# Patient Record
Sex: MALE | Race: WHITE | Employment: OTHER | RURAL
[De-identification: names, ages, dates, MRNs, and addresses within clinical notes are randomized per-mention and may not be internally consistent; named-entity substitution may affect disease eponyms.]

---

## 2021-03-01 PROBLEM — M17.11 PRIMARY OSTEOARTHRITIS OF RIGHT KNEE: Status: ACTIVE | Noted: 2021-03-01

## 2021-03-02 PROBLEM — M17.11 PRIMARY OSTEOARTHRITIS OF RIGHT KNEE: Status: RESOLVED | Noted: 2021-03-01 | Resolved: 2021-03-02

## 2021-03-02 PROBLEM — M17.11 PRIMARY OSTEOARTHRITIS OF RIGHT KNEE: Status: ACTIVE | Noted: 2021-03-02

## 2021-03-03 PROBLEM — M17.11 PRIMARY OSTEOARTHRITIS OF RIGHT KNEE: Status: RESOLVED | Noted: 2021-03-02 | Resolved: 2021-03-03

## 2021-09-24 ENCOUNTER — HOSPITAL ENCOUNTER (OUTPATIENT)
Dept: GENERAL RADIOLOGY | Age: 66
Discharge: HOME | End: 2021-09-24
Payer: MEDICARE

## 2021-09-24 ENCOUNTER — TRANSCRIBE ORDER (OUTPATIENT)
Dept: REGISTRATION | Age: 66
End: 2021-09-24

## 2021-09-24 DIAGNOSIS — M25.512 LEFT SHOULDER PAIN: ICD-10-CM

## 2021-09-24 DIAGNOSIS — M25.511 RIGHT SHOULDER PAIN: ICD-10-CM

## 2021-09-24 DIAGNOSIS — M25.511 RIGHT SHOULDER PAIN: Primary | ICD-10-CM

## 2021-09-24 PROCEDURE — 73030 X-RAY EXAM OF SHOULDER: CPT

## 2022-02-03 ENCOUNTER — TRANSCRIBE ORDER (OUTPATIENT)
Dept: SCHEDULING | Age: 67
End: 2022-02-03

## 2022-02-03 DIAGNOSIS — M19.011 LOCALIZED OSTEOARTHROSIS OF RIGHT SHOULDER REGION: Primary | ICD-10-CM

## 2022-02-04 ENCOUNTER — TRANSCRIBE ORDER (OUTPATIENT)
Dept: SCHEDULING | Age: 67
End: 2022-02-04

## 2022-02-04 DIAGNOSIS — M19.011 OSTEOARTHRITIS OF RIGHT SHOULDER REGION: Primary | ICD-10-CM

## 2022-02-22 ENCOUNTER — HOSPITAL ENCOUNTER (OUTPATIENT)
Dept: CT IMAGING | Age: 67
Discharge: HOME | End: 2022-02-22
Attending: ORTHOPAEDIC SURGERY
Payer: MEDICARE

## 2022-02-22 DIAGNOSIS — M19.011 OSTEOARTHRITIS OF RIGHT SHOULDER REGION: ICD-10-CM

## 2022-02-22 PROCEDURE — 73200 CT UPPER EXTREMITY W/O DYE: CPT

## 2022-03-15 ENCOUNTER — HOSPITAL ENCOUNTER (OUTPATIENT)
Dept: PREADMISSION TESTING | Age: 67
Discharge: HOME | End: 2022-03-15
Attending: ORTHOPAEDIC SURGERY
Payer: MEDICARE

## 2022-03-15 VITALS
HEIGHT: 71 IN | DIASTOLIC BLOOD PRESSURE: 81 MMHG | BODY MASS INDEX: 32.72 KG/M2 | WEIGHT: 233.69 LBS | TEMPERATURE: 98 F | HEART RATE: 80 BPM | RESPIRATION RATE: 20 BRPM | OXYGEN SATURATION: 99 % | SYSTOLIC BLOOD PRESSURE: 143 MMHG

## 2022-03-15 LAB
ABO + RH BLD: NORMAL
ALBUMIN SERPL-MCNC: 4.3 G/DL (ref 3.5–5)
ALBUMIN/GLOB SERPL: 1 {RATIO} (ref 1.1–2.2)
ALP SERPL-CCNC: 71 U/L (ref 45–117)
ALT SERPL-CCNC: 66 U/L (ref 12–78)
ANION GAP SERPL CALC-SCNC: 6 MMOL/L (ref 5–15)
APPEARANCE UR: CLEAR
AST SERPL-CCNC: 31 U/L (ref 15–37)
BACTERIA URNS QL MICRO: NEGATIVE /HPF
BILIRUB SERPL-MCNC: 0.6 MG/DL (ref 0.2–1)
BILIRUB UR QL: NEGATIVE
BLOOD GROUP ANTIBODIES SERPL: NORMAL
BUN SERPL-MCNC: 13 MG/DL (ref 6–20)
BUN/CREAT SERPL: 14 (ref 12–20)
CALCIUM SERPL-MCNC: 9.8 MG/DL (ref 8.5–10.1)
CHLORIDE SERPL-SCNC: 103 MMOL/L (ref 97–108)
CO2 SERPL-SCNC: 25 MMOL/L (ref 21–32)
COLOR UR: NORMAL
CREAT SERPL-MCNC: 0.92 MG/DL (ref 0.7–1.3)
EPITH CASTS URNS QL MICRO: NORMAL /LPF
ERYTHROCYTE [DISTWIDTH] IN BLOOD BY AUTOMATED COUNT: 14 % (ref 11.5–14.5)
GLOBULIN SER CALC-MCNC: 4.3 G/DL (ref 2–4)
GLUCOSE SERPL-MCNC: 88 MG/DL (ref 65–100)
GLUCOSE UR STRIP.AUTO-MCNC: NEGATIVE MG/DL
HCT VFR BLD AUTO: 49.5 % (ref 36.6–50.3)
HGB BLD-MCNC: 16.6 G/DL (ref 12.1–17)
HGB UR QL STRIP: NEGATIVE
HYALINE CASTS URNS QL MICRO: NORMAL /LPF (ref 0–5)
INR PPP: 1.1 (ref 0.9–1.1)
KETONES UR QL STRIP.AUTO: NEGATIVE MG/DL
LEUKOCYTE ESTERASE UR QL STRIP.AUTO: NEGATIVE
MCH RBC QN AUTO: 29.3 PG (ref 26–34)
MCHC RBC AUTO-ENTMCNC: 33.5 G/DL (ref 30–36.5)
MCV RBC AUTO: 87.3 FL (ref 80–99)
NITRITE UR QL STRIP.AUTO: NEGATIVE
NRBC # BLD: 0 K/UL (ref 0–0.01)
NRBC BLD-RTO: 0 PER 100 WBC
PH UR STRIP: 7 [PH] (ref 5–8)
PLATELET # BLD AUTO: 253 K/UL (ref 150–400)
PMV BLD AUTO: 9.3 FL (ref 8.9–12.9)
POTASSIUM SERPL-SCNC: 3.8 MMOL/L (ref 3.5–5.1)
PROT SERPL-MCNC: 8.6 G/DL (ref 6.4–8.2)
PROT UR STRIP-MCNC: NEGATIVE MG/DL
PROTHROMBIN TIME: 11.3 SEC (ref 9–11.1)
RBC # BLD AUTO: 5.67 M/UL (ref 4.1–5.7)
RBC #/AREA URNS HPF: NORMAL /HPF (ref 0–5)
SODIUM SERPL-SCNC: 134 MMOL/L (ref 136–145)
SP GR UR REFRACTOMETRY: 1.01 (ref 1–1.03)
SPECIMEN EXP DATE BLD: NORMAL
UA: UC IF INDICATED,UAUC: NORMAL
UROBILINOGEN UR QL STRIP.AUTO: 0.2 EU/DL (ref 0.2–1)
WBC # BLD AUTO: 7.3 K/UL (ref 4.1–11.1)
WBC URNS QL MICRO: NORMAL /HPF (ref 0–4)

## 2022-03-15 PROCEDURE — 36415 COLL VENOUS BLD VENIPUNCTURE: CPT

## 2022-03-15 PROCEDURE — 82985 ASSAY OF GLYCATED PROTEIN: CPT

## 2022-03-15 PROCEDURE — 85610 PROTHROMBIN TIME: CPT

## 2022-03-15 PROCEDURE — 86900 BLOOD TYPING SEROLOGIC ABO: CPT

## 2022-03-15 PROCEDURE — 80053 COMPREHEN METABOLIC PANEL: CPT

## 2022-03-15 PROCEDURE — 93005 ELECTROCARDIOGRAM TRACING: CPT

## 2022-03-15 PROCEDURE — 97535 SELF CARE MNGMENT TRAINING: CPT | Performed by: OCCUPATIONAL THERAPIST

## 2022-03-15 PROCEDURE — 81001 URINALYSIS AUTO W/SCOPE: CPT

## 2022-03-15 PROCEDURE — 85027 COMPLETE CBC AUTOMATED: CPT

## 2022-03-15 PROCEDURE — 97165 OT EVAL LOW COMPLEX 30 MIN: CPT | Performed by: OCCUPATIONAL THERAPIST

## 2022-03-15 RX ORDER — CHOLECALCIFEROL (VITAMIN D3) 125 MCG
CAPSULE ORAL AS NEEDED
COMMUNITY
End: 2022-03-25

## 2022-03-15 NOTE — PROGRESS NOTES
[facility]  Occupational Therapy Pre-surgery evaluation  [address]  [place], [address] [facility]    OCCUPATIONAL THERAPY PRE TOTAL SHOULDER SURGERY EVALUATION  Patient:Tre Caicedo (77 y.o. 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 Pt is independent at baseline and anticipate pt will do well post surgery. Discussed anticipated disposition to home with possible discharge within a 1 - 2 day time frame post-surgery. Patient and  in agreement. Anticipate patient will not need acute PT and OT orders based on possible  deficits post surgery. Pt is currently independent in adls and IADLs and verbalizes understanding of all education provided this date. Pt reports that he has been practicing using One handed techniques for all adls. He reports familiarity with recovering from joint replacement surgery in his R knee and voices good progress in therapy at that time. GOALS: (Goals have been discussed and agreed upon with patient.)  DISCHARGE GOALS: Time Frame: 1 DAY  1. Patient will demonstrate and/or verbalize full understanding of instructions related compensatory UB ADL techniques, sling management, shoulder/UE positioning for pain control, post operative shoulder exercises and functional mobility in order to minimize functional deficits in preparation for their upcoming surgery.  This will be achieved by using education, demonstration and through the use of an informational handout including a home exercise program.  REHABILITATION POTENTIAL FOR STATED GOALS: Excellent     RECOMMENDATIONS AND PLANNED INTERVENTIONS: (Benefits and precautions of occupational therapy have been discussed with the patient.)    TREATMENT PLAN EFFECTIVE DATES: 3/15/2022 to 3/15/2022        SUBJECTIVE:   Patient stated I've been practicing getting up without pushing.     OBJECTIVE DATA SUMMARY:   HISTORY:      Past Medical History:   Diagnosis Date    Chronic pain     cortisone injections    Gout     Hypertension      Past Surgical History:   Procedure Laterality Date    [place] TOTAL KNEE ARTHROPLASTY Right 03/2021       Prior Level of Function/Home Situation: Pt lives with his wife who is able to assist him prn. Pt and his wife are retired and both independent. Pt reports that he has been practicing using his left hand for adls and also practicing mobility--sit to stand and bed mobility without using his R UE. Personal factors and/or comorbidities impacting plan of care:   Pt reports that he is very pleased with his recovery from R TKA several years ago. Home Situation  Home Environment: Private residence  # Steps to Enter: 5  Rails to Enter: Yes  Hand Rails : Bilateral  One/Two Story Residence: One story  Living Alone: No  Support Systems: Spouse/Significant Other  Patient Expects to be Discharged to[de-identified] Home  Current DME Used/Available at Home: Blood pressure cuff,Cane, straight,Crutches,Raised toilet seat,Walker  Tub or Shower Type: Shower          EXAMINATION/PRESENTATION/DECISION MAKING:     ADLs (Current Functional Status):    Bathing/Showering:   [x] Independent  [] Requires Assistance from Someone for upper body  [] Requires Assistance from Someone for lower body  [] Sponge Bath Only   Ambulation:  [x] Independent  [] Use Assistive Device  [] Use Wheelchair Only     Dressing:  [x] [address] from Someone for:  [] Shirts  [] Bra  [] Clothing fasteners  [] Sock/Shoes  [] Pants  [] Everything   Household Activities:  [x] Routine house and yard work  [] Light Housework Only  [] None       Critical Behavior:  Neurologic State: Alert,Appropriate for age  Orientation Level: Appropriate for age  Cognition: Appropriate decision making,Appropriate for age attention/concentration,Appropriate safety awareness,Follows commands  Safety/Judgement: Awareness of environment,Fall prevention,Home safety,Insight into deficits    Strength:       Not formally tested. Decreased ROM and strength R shoulder                       Tone & Sensation:    tone is normal  Sensation is intact                                 Range Of Motion:  RUE:  Shoulder flexion impaired to approx 90 (50% decreased)  R elbow flexion impaired (minus 25% extension)  B wrists and hands are functional  LUE is functional                                Coordination:    fine motor coordination is intact  Gross motor on R is mildly impaired  Alternating pattern supination/pronation generally intact    Functional Mobility:  Transfers:  Sit to Stand: Independent (voioces awareness of not using RUE to push up to stand)  Stand to Sit: Independent                       Balance:   Sitting: Intact  Standing: Intact (able to  object from floor without LOB using L hand)  ADL Assessment:  Feeding: Independent    Oral Facial Hygiene/Grooming: Independent    Bathing: Independent    Upper Body Dressing: Independent (educ in adaptive technique)    Lower Body Dressing: Independent (educated in adaptive technique)    Toileting: Independent                Functional Measure:  Barthel Index:    Bathin  Bladder: 10  Bowels: 10  Groomin  Dressing: 10  Feeding: 10  Mobility: 15  Stairs: 10  Toilet Use: 10  Transfer (Bed to Chair and Back): 15  Total: 100/100        The Barthel ADL Index: Guidelines  1. The index should be used as a record of what a patient does, not as a record of what a patient could do. 2. The main aim is to establish degree of independence from any help, physical or verbal, however minor and for whatever reason. 3. The need for supervision renders the patient not independent.   4. A patient's performance should be established using the best available evidence. Asking the patient, friends/relatives and nurses are the usual sources, but direct observation and common sense are also important. However direct testing is not needed. 5. Usually the patient's performance over the preceding 24-48 hours is important, but occasionally longer periods will be relevant. 6. Middle categories imply that the patient supplies over 50 per cent of the effort. 7. Use of aids to be independent is allowed. John Mcdermott., Barthel, D.W. (1441). Functional evaluation: the Barthel Index. [address] [facility] (14)2. Mary Gregorio harris JULY Blackwell, Javon Luna., Maria E Gonzalez., [place], [address] (1999). Measuring the change indisability after inpatient rehabilitation; comparison of the responsiveness of the Barthel Index and Functional [place] Measure. Journal of Neurology, Neurosurgery, and Psychiatry, 66(4), 434-080. Asia Mendoza, N.BELA.A, OWEN Callejas, & Josh De Leon MCOCO. (2004.) Assessment of post-stroke quality of life in cost-effectiveness studies: The usefulness of the Barthel Index and the EuroQoL-5D. Quality of Life Research, 13, 427-43      Pain:  Pain Scale 1: Numeric (0 - 10)  Pain Intensity 1: 0  No complaints this date. Pt reported that he did not lilke to take pain medication. Activity Tolerance:   good   Patient []   does  [x]   does not demonstrate signs/symptoms of shortness of breath/dyspnea on exertion/respiratory distress. COMMUNICATION/EDUCATION:   The patient was educated on:  [x]     Early post operative mobility is imperative to achieve a patient's desired outcomes and to restore biological function. [x]     Post operative Reverse Total Shoulder Arthroplasty precautions. These precautions are based on the patient's physician and the procedure(s) performed.   [x]     Patient instructed on the importance of practicing management of ADLs using the non-operative UE only, prior to surgery to prepare for post op Recovery. Pt reports that he has been doing this  [x]     Patient instructed on the importance of practicing bed mobility, transfers and ambulating with operative UE in sling. Pt reports that he simulates sling as sling has not yet been issued  [x]     Reviewed sling donning/doffing procedure. [x]     Provided instruction on use of compensatory upper body dressing techniques and issued handout. [x]     Patient instructed on proper positioning of operative shoulder/UE in bed/chair, in order minimize pain and provide adequate support. [x]     Provided instruction on performance of pendulum exercises, and active assisted shoulder flexion. Reverse Total Shoulder Arthroplasty Precautions and Allowed Activities:  [x]    No shoulder extension or abduction permitted, especially avoiding reaching behind your back to push off surface you are sitting on, or the arm of a chair when transferring to standing. [x]    Active and active assisted shoulder flexion and external rotation with arm against your side are permitted. Pendulum exercises for the shoulder are also permitted until cleared by surgeon to progress activity. [x]    AROM of elbow, hand and wrist on operative extremity permitted. [x]    Sling is for comfort and may be removed to perform pendulum exercises, active assisted/passive shoulder elevation, and upper body ADLs. If arm in sling is causing you to be off balance when walking it can be removed. [x]    Wearing of sling when sleeping is recommended.          The patients plan of care was discussed as follows:   [x]         The patient verbalized understanding of his/her plan in preparation for their upcoming surgery  []         The patient's  was present for this session  []         The patient reports that he/she does not have a  identified at this time  []         The  verbalized understanding of the education regarding the patient's upcoming surgery  [x]         Patient/family agree to work toward stated goals and plan of care. []         Patient understands intent and goals of therapy, but is neutral about his/her participation. []         Patient is unable to participate in goal setting and plan of care.       Thank you for this referral.  Regina Dawikns, OTR/L    32 minutes

## 2022-03-15 NOTE — PERIOP NOTES
Incentive Spirometer        Using the incentive spirometer helps expand the small air sacs of your lungs, helps you breathe deeply, and helps improve your lung function. Use your incentive spirometer twice a day (10 breaths each time) prior to surgery. How to Use Your Incentive Spirometer:  1. Hold the incentive spirometer in an upright position. 2. Breathe out as usual.   3. Place the mouthpiece in your mouth and seal your lips tightly around it. 4. Take a deep breath. Breathe in slowly and as deeply as possible. Keep the blue flow rate guide between the arrows. 5. Hold your breath as long as possible. Then exhale slowly and allow the piston to fall to the bottom of the column. 6. Rest for a few seconds and repeat steps one through five at least 10 times. PAT Tidal Volume_______2500___________  x_______2_________  Date_________03/15/22______________    Marilee Book THE INCENTIVE SPIROMETER WITH YOU TO THE HOSPITAL ON THE DAY OF YOUR SURGERY. Opportunity given to ask and answer questions as well as to observe return demonstration.     Patient signature_____________________________    Witness____________________________

## 2022-03-15 NOTE — PERIOP NOTES
Hibiclens/Chlorhexidine    Preventing Infections Before and After  Your Surgery    IMPORTANT INSTRUCTIONS    Please read and follow these instructions carefully. If you are unable to comply with the below instructions your procedure will be cancelled. Every Night for Three (3) nights before your surgery:  1. Shower with an antibacterial soap, such as Dial, or the soap provided at your preassessment appointment. A shower is better than a bath for cleaning your skin. 2. If needed, ask someone to help you reach all areas of your body. Dont forget to clean your belly button with every shower. The night before your surgery: If you lose your Hibiclens/chlorhexidine please contact surgery center or you can purchase it at a local pharmacy  1. On the night before your surgery, shower with an antibacterial soap, such as Dial, or the soap provided at your preassessment appointment. 2. With one packet of Hibiclens/Chlorhexidine in hand, turn water off.  3. Apply Hibiclens antiseptic skin cleanser with a clean, freshly washed washcloth. ? Gently apply to your body from chin to toes (except the genital area) and especially the area(s) where your incision(s) will be. ? Leave Hibiclens/Chlorhexidine on your skin for at least 20 seconds. CAUTION: If needed, Hibiclens/chlorhexidine may be used to clean the folds of skin of the legs (such as in the area of the groin) and on your buttocks and hips. However, do not use Hibiclens/Chlorhexidine above the neck or in the genital area (your bottom) or put inside any area of your body. 4. Turn the water back on and rinse. 5. Dry gently with a clean, freshly washed towel. 6. After your shower, do not use any powder, deodorant, perfumes or lotion. 7. Use clean, freshly washed towels and washcloths every time you shower. 8. Wear clean, freshly washed pajamas to bed the night before surgery. 9. Sleep on clean, freshly washed sheets.   10. Do not allow pets to sleep in your bed with you. The Morning of your surgery:  1. Shower again thoroughly with an antibacterial soap, such as Dial or the soap provided at your preassessment appointment. If needed, ask someone for help to reach all areas of your body. Dont forget to clean your belly button! Rinse. 2. Dry gently with a clean, freshly washed towel. 3. After your shower, do not use any powder, deodorant, perfumes or lotion prior to surgery. 4. Put on clean, freshly washed clothing. Tips to help prevent infections after your surgery:  1. Protect your surgical wound from germs:  ? Hand washing is the most important thing you and your caregivers can do to prevent infections. ? Keep your bandage clean and dry! ? Do not touch your surgical wound. 2. Use clean, freshly washed towels and washcloths every time you shower; do not share bath linens with others. 3. Until your surgical wound is healed, wear clothing and sleep on bed linens each day that are clean and freshly washed. 4. Do not allow pets to sleep in your bed with you or touch your surgical wound. 5. Do not smoke  smoking delays wound healing. This may be a good time to stop smoking. 6. If you have diabetes, it is important for you to manage your blood sugar levels properly before your surgery as well as after your surgery. Poorly managed blood sugar levels slow down wound healing and prevent you from healing completely. If you lose your Hibiclens/chlorhexidine, please call the [facility], or it is available for purchase at your pharmacy.                ___________________      ___________________      ________________  (Signature of Patient)          (Witness)                   (Date and Time)

## 2022-03-15 NOTE — PERIOP NOTES
[facility] \"Don't shoulder this alone! Tips to prepare and safely care for yourself / Shoulder replacement surgery\" patient handbook was provided & reviewed during the patients pre-admission testing (PAT) appointment. An opportunity for questions was provided, patient verbalized understanding.

## 2022-03-15 NOTE — PERIOP NOTES
[facility]  Ambulatory Surgery Unit  Pre-operative Instructions  COVID TEST   03/21/22 Monday  Surgery/Procedure Date  03/25/22            Tentative Arrival Time TBD  Contact # [phone]    1. On the day of your surgery/procedure, please report to the Ambulatory Surgery Unit Registration Desk and sign in at your designated time. The Ambulatory Surgery Unit is located in [facility] on the [facility] side of the [facility] across from the [address] [place] [address]. Please have all of your health insurance cards and a photo ID. **Due to current COVID restrictions, only ONE adult may accompany you the day of the procedure. We have limited seating available. If our waiting room is at capacity, your ride may be asked to remain in their vehicle. No children are allowed in the waiting room. 2. You must have someone with you to drive you home, as you should not drive a car for 24 hours following anesthesia. Please make arrangements for a responsible adult friend or family member to stay with you for at least the first 24 hours after your surgery. 3. Do not have anything to eat or drink (including water, gum, mints, coffee, juice) after 11:59 PM  03/24/22. This may not apply to medications prescribed by your physician. (Please note below the special instructions with medications to take the morning of surgery, if applicable.)    4. We recommend you do not drink any alcoholic beverages for 24 hours before and after your surgery. 5. Contact your surgeons office for instructions on the following medications: non-steroidal anti-inflammatory drugs (i.e. Advil, Aleve), vitamins, and supplements. (Some surgeons will want you to stop these medications prior to surgery and others may allow you to take them)   **If you are currently taking Plavix, Coumadin, Aspirin and/or other blood-thinning agents, contact your surgeon for instructions. ** Your surgeon will partner with the physician prescribing these medications to determine if it is safe to stop or if you need to continue taking. Please do not stop taking these medications without instructions from your surgeon. 6. In an effort to help prevent surgical site infection, we ask that you shower with an anti-bacterial soap (i.e. Dial/Safeguard, or the soap provided to you at your preadmission testing appointment) for 3 days prior to and on the morning of surgery, using a fresh towel after each shower. (Please begin this process with fresh bed linens.) Do not apply any lotions, powders, or deodorants after the shower on the day of your procedure. If applicable, please do not shave the operative site for 48 hours prior to surgery. 7. Wear comfortable clothes. Wear glasses instead of contacts. Do not bring any jewelry or money (other than copays or fees as instructed). Do not wear make-up, particularly mascara, the morning of your surgery. Do not wear nail polish, particularly if you are having foot /hand surgery. Wear your hair loose or down, no ponytails, buns, maggie pins or clips. All body piercings must be removed. 8. You should understand that if you do not follow these instructions your surgery may be cancelled. If your physical condition changes (i.e. fever, cold or flu) please contact your surgeon as soon as possible. 9. It is important that you be on time. If a situation occurs where you may be late, or if you have any questions or problems, please call [phone].    10. Your surgery time may be subject to change. You will receive a phone call the day prior to surgery to confirm your arrival time. 11. Pediatric patients: please bring a change of clothes, diapers, bottle/sippy cup, pacifier, etc.      Special Instructions:     Take all medications and inhalers, as prescribed, on the morning of surgery with a sip of water EXCEPT:follow surgeon instructions for stopping anti-inflammatory drugs        I understand a pre-operative phone call will be made to verify my surgery time. In the event that I am not available, I give permission for a message to be left on my answering service and/or with another person?       yes         ___________________      ___________________      ________________  (Signature of Patient)          (Witness)                   (Date and Time)

## 2022-03-15 NOTE — PERIOP NOTES
Orthopedic and Spine Patients: Instructions on When You Can   Eat or Drink Before Surgery      You have been provided 2 pre-surgery drinks received at your pre-admission testing appointment.  Night before surgery:  o You should drink one bottle of the  pre-surgery drink at bedtime. No food after midnight!  Day of Surgery:  o Complete 2nd bottle of the pre-surgery drink 1 hour prior to arrival at hospital.  For questions call Pre-Admission Testing at [phone]. They are available from 8:00am-5:00pm, Monday through Friday.

## 2022-03-16 LAB
ATRIAL RATE: 68 BPM
BACTERIA SPEC CULT: NORMAL
BACTERIA SPEC CULT: NORMAL
CALCULATED P AXIS, ECG09: 43 DEGREES
CALCULATED R AXIS, ECG10: 8 DEGREES
CALCULATED T AXIS, ECG11: 39 DEGREES
DIAGNOSIS, 93000: NORMAL
FRUCTOSAMINE SERPL-SCNC: 261 UMOL/L (ref 0–285)
P-R INTERVAL, ECG05: 170 MS
Q-T INTERVAL, ECG07: 384 MS
QRS DURATION, ECG06: 68 MS
QTC CALCULATION (BEZET), ECG08: 408 MS
SERVICE CMNT-IMP: NORMAL
VENTRICULAR RATE, ECG03: 68 BPM

## 2022-03-17 NOTE — ADVANCED PRACTICE NURSE
PAT Nurse Practitioner   Pre-Operative Chart Review/Assessment:-ORTHOPEDIC/NEUROSURGICAL SPINE                Patient Name:  Shameka Spears                                                           Age:   78 y.o.    :  1955     Today's Date:  3/17/2022     Date of PAT:   3/15/22      Date of Surgery:    3/25/2022      Procedure(s):  Right  Shoulder Reverse Arthroplasty, Axillary Nerve Neuroplasty     Surgeon:   Krystal Peterson     Medical Clearance:  Not required-PCP is Dr. Ethelene Bamberger:      1)  Cardiac Clearance:  Not requested        2)  Program for Diabetes Health Consult:  Not indicated-Fructosamine 261      3)  Sleep Apnea evaluation:   ABBY 4 w/ no reported loud snoring or witnessed apnea while sleeping       4) Treatment for MRSA/Staph Aureus:  Negative       5) Additional Concerns:  BMI 32, HTN, chronic pain                 Vital Signs:         Visit Vitals  BP (!) 143/81 (BP 1 Location: Left upper arm, BP Patient Position: Sitting)   Pulse 80   Temp 98 °F (36.7 °C)   Resp 20   Ht 5' 11\" (1.803 m)   Wt 106 kg (233 lb 11 oz)   SpO2 99%   BMI 32.59 kg/m²                        ____________________________________________  PAST MEDICAL HISTORY  Past Medical History:   Diagnosis Date    Chronic pain     cortisone injections    Gout     Hypertension       ____________________________________________  PAST SURGICAL HISTORY  Past Surgical History:   Procedure Laterality Date    SD TOTAL KNEE ARTHROPLASTY Right 2021      ____________________________________________  HOME MEDICATIONS    Current Outpatient Medications   Medication Sig    naproxen sodium (Aleve) 220 mg cap Take  by mouth as needed.      No current facility-administered medications for this encounter.      ____________________________________________  ALLERGIES  No Known Allergies   ____________________________________________  SOCIAL HISTORY  Social History     Tobacco Use    Smoking status: Never Smoker    Smokeless tobacco: Never Used   Substance Use Topics    Alcohol use: Yes     Alcohol/week: 2.0 standard drinks     Types: 2 Cans of beer per week     Comment: daily      ____________________________________________  COVID VACCINATION STATUS:      Internal Administration   First Dose COVID-19, J&J, PF, 0.5 mL Dose  04/27/2021   Second Dose         Last COVID Lab SARS-CoV-2 ( )   Date Value   02/26/2021 Not Detected                      Labs:     Hospital Outpatient Visit on 03/15/2022   Component Date Value Ref Range Status    WBC 03/15/2022 7.3  4.1 - 11.1 K/uL Final    RBC 03/15/2022 5.67  4.10 - 5.70 M/uL Final    HGB 03/15/2022 16.6  12.1 - 17.0 g/dL Final    HCT 03/15/2022 49.5  36.6 - 50.3 % Final    MCV 03/15/2022 87.3  80.0 - 99.0 FL Final    MCH 03/15/2022 29.3  26.0 - 34.0 PG Final    MCHC 03/15/2022 33.5  30.0 - 36.5 g/dL Final    RDW 03/15/2022 14.0  11.5 - 14.5 % Final    PLATELET 09/48/1509 797  150 - 400 K/uL Final    MPV 03/15/2022 9.3  8.9 - 12.9 FL Final    NRBC 03/15/2022 0.0  0  WBC Final    ABSOLUTE NRBC 03/15/2022 0.00  0.00 - 0.01 K/uL Final    Special Requests: 03/15/2022 NO SPECIAL REQUESTS    Final    Culture result: 03/15/2022 MRSA NOT PRESENT    Final    Culture result: 03/15/2022 Screening of patient nares for MRSA is for surveillance purposes and, if positive, to facilitate isolation considerations in high risk settings. It is not intended for automatic decolonization interventions per se as regimens are not sufficiently effective to warrant routine use.     Final    Ventricular Rate 03/15/2022 68  BPM Final    Atrial Rate 03/15/2022 68  BPM Final    P-R Interval 03/15/2022 170  ms Final    QRS Duration 03/15/2022 68  ms Final    Q-T Interval 03/15/2022 384  ms Final    QTC Calculation (Bezet) 03/15/2022 408  ms Final    Calculated P Axis 03/15/2022 43  degrees Final    Calculated R Axis 03/15/2022 8  degrees Final    Calculated T Axis 03/15/2022 39  degrees Final  Diagnosis 03/15/2022    Final                    Value:Normal sinus rhythm  Normal ECG  When compared with ECG of 03-FEB-2021 10:39,  No significant change was found  Confirmed by Tre Knapp ([id number]) on 3/16/2022 1:20:37 PM      INR 03/15/2022 1.1  0.9 - 1.1   Final    A single therapeutic range for Vit K antagonists may not be optimal for all indications - see June, 2008 issue of Chest, American College of Chest [employer] Evidence-Based Clinical Practice Guidelines, 8th Edition.  Prothrombin time 03/15/2022 11.3* 9.0 - 11.1 sec Final    Color 03/15/2022 YELLOW/STRAW    Final    Color Reference Range: Straw, Yellow or Dark Yellow    Appearance 03/15/2022 CLEAR  CLEAR   Final    Specific gravity 03/15/2022 1.008  1.003 - 1.030   Final    pH (UA) 03/15/2022 7.0  5.0 - 8.0   Final    Protein 03/15/2022 Negative  NEG mg/dL Final    Glucose 03/15/2022 Negative  NEG mg/dL Final    Ketone 03/15/2022 Negative  NEG mg/dL Final    Bilirubin 03/15/2022 Negative  NEG   Final    Blood 03/15/2022 Negative  NEG   Final    Urobilinogen 03/15/2022 0.2  0.2 - 1.0 EU/dL Final    Nitrites 03/15/2022 Negative  NEG   Final    Leukocyte Esterase 03/15/2022 Negative  NEG   Final    WBC 03/15/2022 0-4  0 - 4 /hpf Final    RBC 03/15/2022 0-5  0 - 5 /hpf Final    Epithelial cells 03/15/2022 FEW  FEW /lpf Final    Epithelial cell category consists of squamous cells and /or transitional urothelial cells. Renal tubular cells, if present, are separately identified as such.     Bacteria 03/15/2022 Negative  NEG /hpf Final    UA:UC IF INDICATED 03/15/2022 CULTURE NOT INDICATED BY UA RESULT  CNI   Final    Hyaline cast 03/15/2022 0-2  0 - 5 /lpf Final    Sodium 03/15/2022 134* 136 - 145 mmol/L Final    Potassium 03/15/2022 3.8  3.5 - 5.1 mmol/L Final    Chloride 03/15/2022 103  97 - 108 mmol/L Final    CO2 03/15/2022 25  21 - 32 mmol/L Final    Anion gap 03/15/2022 6  5 - 15 mmol/L Final    Glucose 03/15/2022 88 65 - 100 mg/dL Final    BUN 03/15/2022 13  6 - 20 MG/DL Final    Creatinine 03/15/2022 0.92  0.70 - 1.30 MG/DL Final    BUN/Creatinine ratio 03/15/2022 14  12 - 20   Final    GFR est AA 03/15/2022 >60  >60 ml/min/1.73m2 Final    GFR est non-AA 03/15/2022 >60  >60 ml/min/1.73m2 Final    Estimated GFR is calculated using the IDMS-traceable Modification of Diet in Renal Disease (MDRD) Study equation, reported for both  Americans (GFRAA) and non- Americans (GFRNA), and normalized to 1.73m2 body surface area. The physician must decide which value applies to the patient.  Calcium 03/15/2022 9.8  8.5 - 10.1 MG/DL Final    Bilirubin, total 03/15/2022 0.6  0.2 - 1.0 MG/DL Final    ALT (SGPT) 03/15/2022 66  12 - 78 U/L Final    AST (SGOT) 03/15/2022 31  15 - 37 U/L Final    Alk. phosphatase 03/15/2022 71  45 - 117 U/L Final    Protein, total 03/15/2022 8.6* 6.4 - 8.2 g/dL Final    Albumin 03/15/2022 4.3  3.5 - 5.0 g/dL Final    Globulin 03/15/2022 4.3* 2.0 - 4.0 g/dL Final    A-G Ratio 03/15/2022 1.0* 1.1 - 2.2   Final    Fructosamine 03/15/2022 261  0 - 285 umol/L Final    Comment: (NOTE)  Published reference interval for apparently healthy subjects  between age 21 and 61 is 1 - 285 umol/L and in a poorly  controlled diabetic population is 228 - 563 umol/L with a  mean of 396 umol/L. Performed At: [address] [id number]  Raheel Aparicio MD I      Crossmatch Expiration 03/15/2022 2022,2359   Final    ABO/Rh(D) 03/15/2022 O POSITIVE   Final    Antibody screen 03/15/2022 NEG   Final        XR Results (most recent):    Results from Hospital Encounter encounter on 21    XR SHOULDER RT AP/LAT MIN 2 V    Narrative  EXAM: XR SHOULDER RT AP/LAT MIN 2 V    INDICATION: Pain. COMPARISON: None.     FINDINGS: Three views of the right shoulder demonstrate severe osteoarthritis at  the glenohumeral joint, with marked joint space narrowing and osteophyte  formation. There is a calcific lesion which projects over the lateral body of  the scapula on the frontal projections, which appears to be extraosseous and  subcoracoid in location on the lateral projection. This may relate to synovial  osteochondromatosis. There is mild right AC joint hypertrophy. The subacromial  space is preserved. Impression  No acute findings. Severe osteoarthritis of the glenohumeral joint. 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 Skin:   Denies open wounds, cuts, sores, rashes or other areas of concern in PAT assessment.         Sheri Mack, NP

## 2022-03-21 ENCOUNTER — HOSPITAL ENCOUNTER (OUTPATIENT)
Dept: PREADMISSION TESTING | Age: 67
Discharge: HOME | End: 2022-03-21
Attending: ORTHOPAEDIC SURGERY
Payer: MEDICARE

## 2022-03-21 PROCEDURE — U0005 INFEC AGEN DETEC AMPLI PROBE: HCPCS

## 2022-03-22 LAB
SARS-COV-2, XPLCVT: NOT DETECTED
SOURCE, COVRS: NORMAL

## 2022-03-23 PROBLEM — M75.101 RIGHT ROTATOR CUFF TEAR ARTHROPATHY: Status: ACTIVE | Noted: 2022-03-23

## 2022-03-23 PROBLEM — M12.811 RIGHT ROTATOR CUFF TEAR ARTHROPATHY: Status: ACTIVE | Noted: 2022-03-23

## 2022-03-23 NOTE — H&P
PRE- OP History and Physical                             Subjective:     Patient is a 78 y.o. male presented with a history of follow-up for worsening of known chronic rotator cuff arthropathy on his R shoulder pain. He notes no change in his sxs since the last visit, and continues to have persistent pain and stiffness. He cites his hobbies include fishing and hunting. He reports he is non-diabetic, non-smoker.     Of note, he cites hx of hypertension managed by medication, and denies hx of heart attack or stroke.    .  The patient's condition has been resistant to non-operative treatment and is being admitted for surgical management of this condition. Patient Active Problem List    Diagnosis Date Noted    Right rotator cuff tear arthropathy 03/23/2022     Past Medical History:   Diagnosis Date    Chronic pain     cortisone injections    Gout     Hypertension       Past Surgical History:   Procedure Laterality Date    [place] TOTAL KNEE ARTHROPLASTY Right 03/2021      Prior to Admission medications    Medication Sig Start Date End Date Taking? Authorizing Provider   naproxen sodium (Aleve) 220 mg cap Take  by mouth as needed. Provider, Historical     No Known Allergies   Social History     Tobacco Use    Smoking status: Never Smoker    Smokeless tobacco: Never Used   Substance Use Topics    Alcohol use: Yes     Alcohol/week: 2.0 standard drinks     Types: 2 Cans of beer per week     Comment: daily      Family History   Problem Relation Age of Onset    Heart Disease Father       Review of Systems  A comprehensive review of systems was negative except for that written in the HPI. Objective:     No data found. There were no vitals taken for this visit. General:  Alert, cooperative, no distress, appears stated age. Head:  Normocephalic, without obvious abnormality, atraumatic. Eyes:  Conjunctivae/corneas clear. PERRL, EOMs intact.     Ears:  Normal TMs and external ear canals both ears. Nose: Nares normal. Septum midline. Mucosa normal. No drainage or sinus tenderness. Throat: Lips, mucosa, and tongue normal. Teeth and gums normal.   Neck: Supple, symmetrical, trachea midline, no adenopathy, thyroid: no enlargement/tenderness/nodules, no carotid bruit and no JVD. Back:   Symmetric, no curvature. ROM normal. No CVA tenderness. Lungs:   Clear to auscultation bilaterally. Chest wall:  No tenderness or deformity. Heart:  Regular rate and rhythm, S1, S2, no murmur, click, rub or gallop. Abdomen:   Soft, non-tender. Bowel sounds normal. No masses,  No organomegaly. Extremities: Extremities normal except The R shoulder exam reveals ROM: 100 elevation, normal skin, normal pulse. , atraumatic, no cyanosis or edema. Pulses: 2+ and symmetric all extremities. Skin: Skin color, texture, turgor normal. No rashes or lesions   Lymph nodes: Cervical, supraclavicular, and axillary nodes normal.   Neurologic: CNII-XII intact. Neurovascular exam intact in distal extremities        Imaging Review          CT Upper Extremity Right Without Contrast (22167)     Result Date: 2022  [facility] - [facility] - CT UP EXT RT WO CONT Patient: Elkin Mae : 1955 Date of Service: 2022 12:31:00 PM Reason For Exam: Ordering Provider: Kathleen Romo Physician: Aishwarya Webster Signing Date: 2022 1:19:30 PM INDICATION: Right shoulder pain, osteoarthritis. COMPARISON: Radiographs 2021 EXAM: Axial CT imaging of the right shoulder girdle with oblique coronal and sagittal reformations. One or more of the following dose reduction techniques were used: automated exposure control, adjustment of the mA and/or kV according to patient size, use of iterative reconstructive technique.  FINDINGS: There is severe osteoarthrosis of the glenohumeral joint with bone-on-bone apposition associated with glenoid greater than humeral subchondral sclerosis, humeral superior subluxation, and loose bodies in the axillary and subscapularis recesses measuring up to 2.3 cm in size. There is diminished glenoid bone stock, with glenoid transverse marrow dimension measuring 16 mm, and glenoid version measuring -13 degrees. A moderate effusion of the glenohumeral joint is shown. There is moderate AC joint osteoarthrosis. There is a type II acromion. No muscle atrophy is shown. No soft tissue mass is demonstrated. Note is made of the presence of 2 adjacent nodules in the anterior segment of the right upper lobe on axial images 58 and 60 measuring 4 mm and 5 mm, respectively. IMPRESSION: 1. Severe right groin humeral osteoarthrosis. 2. There are 2 adjacent right upper lobe pulmonary nodules measuring 4 mm and 5 mm. Guidelines for Management of Incidental Pulmonary Nodules Detected on CT Images: from the Fleischner Society 2017 LOW-RISK PATIENTS: LESS THAN OR EQUAL TO 6 MM:  No routine follow-up needed. GREATER THAN 6-8 MM: CT at 6-12 months, if multiple at 3-6 months, then consider CT at 18-24 months. GREATER THAN 8 MM:  Consider CT at 3 months, PET/CT, or tissue sampling. If multiple CT at 3-6 months, then consider CT at 18-24 months. HIGH-RISK PATIENTS: LESS THAN OR EQUAL TO 6 MM:  Optional CT at 12 months. GREATER THAN 6-8 MM: CT at 6-12 months, if multiple at 3-6 months, then CT at 18-24 months. GREATER THAN 8 MM:  Consider CT at 3 months, PET/CT, or tissue sampling. If multiple CT at 3-6 months, then at 18-24 months. Guidelines for Management of Incidental Pulmonary Nodules Detected on CT : A Statement from the [address] [place] [address] Dr 2017\"  Tiara Thompson. Radiology 2017; 000:1-16  Signing date/time: 2/22/2022  1:19 PM Signed by: Sally Calix have personally and independently reviewed CT scan and the report of R shoulder, which reveals 15 degrees of glenoid inclination and 12 degrees of retroversion.     Assessment:     Active Problems:    Right rotator cuff tear arthropathy (3/23/2022)        Plan:   \"Ubaldo\" has rotator cuff arthropathy in his R shoulder. His CT scan showed 15 degrees of inclination and 12 degrees of retroversion. Recommend reverse shoulder arthroplasty. I discussed the natural history and natural progression of diagnoses. I reviewed the patient's medical record, previous office notes, and  discussed findings, imaging, impression, treatment options, and plan with the patient. Treatment options discussed to include no intervention, prescription strength oral anti-inflammatories, cortisone injections, oral steroids, therapy, MRI, and surgery.      Discussed risk and benefit of reverse shoulder arthroplasty, including chance of infection, reduced ROM in external and internal rotation. Cautioned pushing from behind getting out of chair or bed may cause dislocation of the arthroplasty.     After discussion and answering questions, it was elected to proceed with reverse shoulder arthroplasty. Follow-up for the surgery. Operative and non-operative treatments have been discussed with the patient including risks and benefits of each. After consideration of risks, benefits limitations to the consented procedures and alternative options for treatment, the patient has consented to surgical interventions. Questions were answered and Pre-op teaching was completed.       LOREN Huber

## 2022-03-24 ENCOUNTER — ANESTHESIA EVENT (OUTPATIENT)
Dept: SURGERY | Age: 67
End: 2022-03-24
Payer: MEDICARE

## 2022-03-24 PROBLEM — M12.811 RIGHT ROTATOR CUFF TEAR ARTHROPATHY: Status: ACTIVE | Noted: 2022-03-23

## 2022-03-24 PROBLEM — M75.101 RIGHT ROTATOR CUFF TEAR ARTHROPATHY: Status: ACTIVE | Noted: 2022-03-23

## 2022-03-25 ENCOUNTER — HOSPITAL ENCOUNTER (OUTPATIENT)
Age: 67
Setting detail: OUTPATIENT SURGERY
Discharge: HOME | End: 2022-03-25
Attending: ORTHOPAEDIC SURGERY | Admitting: ORTHOPAEDIC SURGERY
Payer: MEDICARE

## 2022-03-25 ENCOUNTER — ANESTHESIA (OUTPATIENT)
Dept: SURGERY | Age: 67
End: 2022-03-25
Payer: MEDICARE

## 2022-03-25 VITALS
TEMPERATURE: 97.8 F | OXYGEN SATURATION: 96 % | DIASTOLIC BLOOD PRESSURE: 80 MMHG | BODY MASS INDEX: 32.34 KG/M2 | SYSTOLIC BLOOD PRESSURE: 135 MMHG | HEART RATE: 73 BPM | WEIGHT: 231 LBS | RESPIRATION RATE: 22 BRPM | HEIGHT: 71 IN

## 2022-03-25 DIAGNOSIS — M75.101 RIGHT ROTATOR CUFF TEAR ARTHROPATHY: Primary | ICD-10-CM

## 2022-03-25 DIAGNOSIS — M12.811 RIGHT ROTATOR CUFF TEAR ARTHROPATHY: Primary | ICD-10-CM

## 2022-03-25 PROCEDURE — 74011250636 HC RX REV CODE- 250/636: Performed by: REGISTERED NURSE

## 2022-03-25 PROCEDURE — 77030010507 HC ADH SKN DERMBND J&J -B: Performed by: ORTHOPAEDIC SURGERY

## 2022-03-25 PROCEDURE — 77030010782 HC BOWL MX BN ENCR -B: Performed by: ORTHOPAEDIC SURGERY

## 2022-03-25 PROCEDURE — 74011000250 HC RX REV CODE- 250: Performed by: ORTHOPAEDIC SURGERY

## 2022-03-25 PROCEDURE — 76030000020 HC AMB SURG 1.5 TO 2 HR INTENSV-TIER 1: Performed by: ORTHOPAEDIC SURGERY

## 2022-03-25 PROCEDURE — 77030006835 HC BLD SAW SAG STRY -B: Performed by: ORTHOPAEDIC SURGERY

## 2022-03-25 PROCEDURE — 77030021352 HC CBL LD SYS DISP COVD -B: Performed by: ORTHOPAEDIC SURGERY

## 2022-03-25 PROCEDURE — 77030026438 HC STYL ET INTUB CARD -A: Performed by: ANESTHESIOLOGY

## 2022-03-25 PROCEDURE — 74011000250 HC RX REV CODE- 250: Performed by: REGISTERED NURSE

## 2022-03-25 PROCEDURE — C9290 INJ, BUPIVACAINE LIPOSOME: HCPCS | Performed by: ORTHOPAEDIC SURGERY

## 2022-03-25 PROCEDURE — 77030031139 HC SUT VCRL2 J&J -A: Performed by: ORTHOPAEDIC SURGERY

## 2022-03-25 PROCEDURE — 77030008684 HC TU ET CUF COVD -B: Performed by: ANESTHESIOLOGY

## 2022-03-25 PROCEDURE — 77030019908 HC STETH ESOPH SIMS -A: Performed by: ANESTHESIOLOGY

## 2022-03-25 PROCEDURE — 77030018673: Performed by: ORTHOPAEDIC SURGERY

## 2022-03-25 PROCEDURE — 74011250636 HC RX REV CODE- 250/636: Performed by: ANESTHESIOLOGY

## 2022-03-25 PROCEDURE — 74011250636 HC RX REV CODE- 250/636: Performed by: ORTHOPAEDIC SURGERY

## 2022-03-25 PROCEDURE — 76210000034 HC AMBSU PH I REC 0.5 TO 1 HR: Performed by: ORTHOPAEDIC SURGERY

## 2022-03-25 PROCEDURE — 77030002922 HC SUT FBRWRE ARTH -B: Performed by: ORTHOPAEDIC SURGERY

## 2022-03-25 PROCEDURE — 74011000258 HC RX REV CODE- 258: Performed by: REGISTERED NURSE

## 2022-03-25 PROCEDURE — C1776 JOINT DEVICE (IMPLANTABLE): HCPCS | Performed by: ORTHOPAEDIC SURGERY

## 2022-03-25 PROCEDURE — 2709999900 HC NON-CHARGEABLE SUPPLY: Performed by: ORTHOPAEDIC SURGERY

## 2022-03-25 PROCEDURE — 76060000063 HC AMB SURG ANES 1.5 TO 2 HR: Performed by: ORTHOPAEDIC SURGERY

## 2022-03-25 PROCEDURE — 77030021678 HC GLIDESCP STAT DISP VERT -B: Performed by: ANESTHESIOLOGY

## 2022-03-25 PROCEDURE — C9290 INJ, BUPIVACAINE LIPOSOME: HCPCS | Performed by: ANESTHESIOLOGY

## 2022-03-25 PROCEDURE — 76210000050 HC AMBSU PH II REC 0.5 TO 1 HR: Performed by: ORTHOPAEDIC SURGERY

## 2022-03-25 PROCEDURE — C1713 ANCHOR/SCREW BN/BN,TIS/BN: HCPCS | Performed by: ORTHOPAEDIC SURGERY

## 2022-03-25 PROCEDURE — 74011000250 HC RX REV CODE- 250: Performed by: ANESTHESIOLOGY

## 2022-03-25 DEVICE — IMPL CAPPED SHOULDER S3 TOTAL ADV REVERSE DJO: Type: IMPLANTABLE DEVICE | Status: FUNCTIONAL

## 2022-03-25 DEVICE — SCREW, LOCKING BONE, RSP, 5X18
Type: IMPLANTABLE DEVICE | Site: SHOULDER | Status: FUNCTIONAL
Brand: DJO SURGICAL

## 2022-03-25 DEVICE — SCREW, LOCKING BONE, RSP, 5X22
Type: IMPLANTABLE DEVICE | Site: SHOULDER | Status: FUNCTIONAL
Brand: DJO SURGICAL

## 2022-03-25 DEVICE — INSERT HUM L36MM MONOBLOCK STD SOCK RSP HXE +: Type: IMPLANTABLE DEVICE | Site: SHOULDER | Status: FUNCTIONAL

## 2022-03-25 DEVICE — GLENOID, HEAD W/RETAINING SCREW, RSP, 36MM, NEUTRAL
Type: IMPLANTABLE DEVICE | Site: SHOULDER | Status: FUNCTIONAL
Brand: DJO SURGICAL

## 2022-03-25 DEVICE — RSP, PRIMARY HUMERAL MONOBLOCK, 8MM X 108
Type: IMPLANTABLE DEVICE | Site: SHOULDER | Status: FUNCTIONAL
Brand: DJO SURGICAL

## 2022-03-25 DEVICE — PLUG, CEMENT SZ. 12.0
Type: IMPLANTABLE DEVICE | Site: SHOULDER | Status: FUNCTIONAL
Brand: DJO SURGICAL

## 2022-03-25 DEVICE — BASEPLATE, GLENOID HA-COAT, RSP, 6.5MM X 30MM
Type: IMPLANTABLE DEVICE | Site: SHOULDER | Status: FUNCTIONAL
Brand: DJO SURGICAL

## 2022-03-25 DEVICE — SCREW, LOCKING BONE, RSP, 5X14
Type: IMPLANTABLE DEVICE | Site: SHOULDER | Status: FUNCTIONAL
Brand: DJO SURGICAL

## 2022-03-25 DEVICE — COBALT G-HV BONE CEMENT 40GM
Type: IMPLANTABLE DEVICE | Site: SHOULDER | Status: FUNCTIONAL
Brand: DJO SURGICAL

## 2022-03-25 RX ORDER — POLYETHYLENE GLYCOL 3350 17 G/17G
17 POWDER, FOR SOLUTION ORAL DAILY
Status: CANCELLED | OUTPATIENT
Start: 2022-03-25

## 2022-03-25 RX ORDER — SODIUM CHLORIDE 0.9 % (FLUSH) 0.9 %
5-40 SYRINGE (ML) INJECTION AS NEEDED
Status: DISCONTINUED | OUTPATIENT
Start: 2022-03-25 | End: 2022-03-25 | Stop reason: HOSPADM

## 2022-03-25 RX ORDER — BUPIVACAINE HYDROCHLORIDE 5 MG/ML
INJECTION, SOLUTION EPIDURAL; INTRACAUDAL
Status: COMPLETED
Start: 2022-03-25 | End: 2022-03-25

## 2022-03-25 RX ORDER — MELOXICAM 15 MG/1
15 TABLET ORAL DAILY
Qty: 30 TABLET | Refills: 2 | Status: SHIPPED | OUTPATIENT
Start: 2022-03-25 | End: 2022-08-19

## 2022-03-25 RX ORDER — AMOXICILLIN 250 MG
1 CAPSULE ORAL 2 TIMES DAILY
Status: CANCELLED | OUTPATIENT
Start: 2022-03-25

## 2022-03-25 RX ORDER — DROPERIDOL 2.5 MG/ML
0.62 INJECTION, SOLUTION INTRAMUSCULAR; INTRAVENOUS AS NEEDED
Status: DISCONTINUED | OUTPATIENT
Start: 2022-03-25 | End: 2022-03-25 | Stop reason: HOSPADM

## 2022-03-25 RX ORDER — HYDROMORPHONE HYDROCHLORIDE 1 MG/ML
0.5 INJECTION, SOLUTION INTRAMUSCULAR; INTRAVENOUS; SUBCUTANEOUS
Status: CANCELLED | OUTPATIENT
Start: 2022-03-25 | End: 2022-03-26

## 2022-03-25 RX ORDER — DEXAMETHASONE SODIUM PHOSPHATE 4 MG/ML
INJECTION, SOLUTION INTRA-ARTICULAR; INTRALESIONAL; INTRAMUSCULAR; INTRAVENOUS; SOFT TISSUE AS NEEDED
Status: DISCONTINUED | OUTPATIENT
Start: 2022-03-25 | End: 2022-03-25 | Stop reason: HOSPADM

## 2022-03-25 RX ORDER — OXYCODONE HYDROCHLORIDE 5 MG/1
10 TABLET ORAL
Status: CANCELLED | OUTPATIENT
Start: 2022-03-25

## 2022-03-25 RX ORDER — DIPHENHYDRAMINE HYDROCHLORIDE 50 MG/ML
12.5 INJECTION, SOLUTION INTRAMUSCULAR; INTRAVENOUS
Status: CANCELLED | OUTPATIENT
Start: 2022-03-25 | End: 2022-03-26

## 2022-03-25 RX ORDER — OXYCODONE HYDROCHLORIDE 5 MG/1
5 TABLET ORAL
Status: CANCELLED | OUTPATIENT
Start: 2022-03-25

## 2022-03-25 RX ORDER — NEOSTIGMINE METHYLSULFATE 1 MG/ML
INJECTION, SOLUTION INTRAVENOUS AS NEEDED
Status: DISCONTINUED | OUTPATIENT
Start: 2022-03-25 | End: 2022-03-25 | Stop reason: HOSPADM

## 2022-03-25 RX ORDER — HYDROMORPHONE HYDROCHLORIDE 1 MG/ML
.2-.5 INJECTION, SOLUTION INTRAMUSCULAR; INTRAVENOUS; SUBCUTANEOUS ONCE
Status: DISCONTINUED | OUTPATIENT
Start: 2022-03-25 | End: 2022-03-25 | Stop reason: HOSPADM

## 2022-03-25 RX ORDER — SODIUM CHLORIDE 0.9 % (FLUSH) 0.9 %
5-40 SYRINGE (ML) INJECTION EVERY 8 HOURS
Status: DISCONTINUED | OUTPATIENT
Start: 2022-03-25 | End: 2022-03-25 | Stop reason: HOSPADM

## 2022-03-25 RX ORDER — LIDOCAINE HYDROCHLORIDE 20 MG/ML
INJECTION, SOLUTION EPIDURAL; INFILTRATION; INTRACAUDAL; PERINEURAL AS NEEDED
Status: DISCONTINUED | OUTPATIENT
Start: 2022-03-25 | End: 2022-03-25 | Stop reason: HOSPADM

## 2022-03-25 RX ORDER — BUPIVACAINE HYDROCHLORIDE 5 MG/ML
INJECTION, SOLUTION EPIDURAL; INTRACAUDAL
Status: COMPLETED | OUTPATIENT
Start: 2022-03-25 | End: 2022-03-25

## 2022-03-25 RX ORDER — ONDANSETRON 2 MG/ML
4 INJECTION INTRAMUSCULAR; INTRAVENOUS
Status: CANCELLED | OUTPATIENT
Start: 2022-03-25 | End: 2022-03-26

## 2022-03-25 RX ORDER — PROPOFOL 10 MG/ML
INJECTION, EMULSION INTRAVENOUS AS NEEDED
Status: DISCONTINUED | OUTPATIENT
Start: 2022-03-25 | End: 2022-03-25 | Stop reason: HOSPADM

## 2022-03-25 RX ORDER — KETOROLAC TROMETHAMINE 30 MG/ML
15 INJECTION, SOLUTION INTRAMUSCULAR; INTRAVENOUS EVERY 6 HOURS
Status: CANCELLED | OUTPATIENT
Start: 2022-03-25 | End: 2022-03-26

## 2022-03-25 RX ORDER — PHENYLEPHRINE HCL IN 0.9% NACL 0.4MG/10ML
SYRINGE (ML) INTRAVENOUS AS NEEDED
Status: DISCONTINUED | OUTPATIENT
Start: 2022-03-25 | End: 2022-03-25 | Stop reason: HOSPADM

## 2022-03-25 RX ORDER — NALOXONE HYDROCHLORIDE 0.4 MG/ML
0.4 INJECTION, SOLUTION INTRAMUSCULAR; INTRAVENOUS; SUBCUTANEOUS AS NEEDED
Status: CANCELLED | OUTPATIENT
Start: 2022-03-25

## 2022-03-25 RX ORDER — MORPHINE SULFATE 10 MG/ML
2 INJECTION, SOLUTION INTRAMUSCULAR; INTRAVENOUS
Status: DISCONTINUED | OUTPATIENT
Start: 2022-03-25 | End: 2022-03-25 | Stop reason: HOSPADM

## 2022-03-25 RX ORDER — FAMOTIDINE 20 MG/1
20 TABLET, FILM COATED ORAL 2 TIMES DAILY
Status: CANCELLED | OUTPATIENT
Start: 2022-03-25

## 2022-03-25 RX ORDER — DIPHENHYDRAMINE HYDROCHLORIDE 50 MG/ML
12.5 INJECTION, SOLUTION INTRAMUSCULAR; INTRAVENOUS AS NEEDED
Status: DISCONTINUED | OUTPATIENT
Start: 2022-03-25 | End: 2022-03-25 | Stop reason: HOSPADM

## 2022-03-25 RX ORDER — ONDANSETRON 4 MG/1
4 TABLET, FILM COATED ORAL
Qty: 10 TABLET | Refills: 1 | Status: SHIPPED | OUTPATIENT
Start: 2022-03-25 | End: 2022-08-19

## 2022-03-25 RX ORDER — SODIUM CHLORIDE, SODIUM LACTATE, POTASSIUM CHLORIDE, CALCIUM CHLORIDE 600; 310; 30; 20 MG/100ML; MG/100ML; MG/100ML; MG/100ML
25 INJECTION, SOLUTION INTRAVENOUS CONTINUOUS
Status: DISCONTINUED | OUTPATIENT
Start: 2022-03-25 | End: 2022-03-25 | Stop reason: HOSPADM

## 2022-03-25 RX ORDER — TRANEXAMIC ACID 100 MG/ML
INJECTION, SOLUTION INTRAVENOUS
Status: DISCONTINUED
Start: 2022-03-25 | End: 2022-03-25 | Stop reason: HOSPADM

## 2022-03-25 RX ORDER — SUCCINYLCHOLINE CHLORIDE 20 MG/ML
INJECTION INTRAMUSCULAR; INTRAVENOUS AS NEEDED
Status: DISCONTINUED | OUTPATIENT
Start: 2022-03-25 | End: 2022-03-25 | Stop reason: HOSPADM

## 2022-03-25 RX ORDER — CEPHALEXIN 500 MG/1
500 CAPSULE ORAL 3 TIMES DAILY
Qty: 10 CAPSULE | Refills: 0 | Status: SHIPPED | OUTPATIENT
Start: 2022-03-25 | End: 2022-03-28

## 2022-03-25 RX ORDER — ROCURONIUM BROMIDE 10 MG/ML
INJECTION, SOLUTION INTRAVENOUS AS NEEDED
Status: DISCONTINUED | OUTPATIENT
Start: 2022-03-25 | End: 2022-03-25 | Stop reason: HOSPADM

## 2022-03-25 RX ORDER — GABAPENTIN 300 MG/1
300 CAPSULE ORAL
Qty: 3 CAPSULE | Refills: 0 | Status: SHIPPED | OUTPATIENT
Start: 2022-03-25 | End: 2022-08-19

## 2022-03-25 RX ORDER — MIDAZOLAM HYDROCHLORIDE 1 MG/ML
INJECTION, SOLUTION INTRAMUSCULAR; INTRAVENOUS AS NEEDED
Status: DISCONTINUED | OUTPATIENT
Start: 2022-03-25 | End: 2022-03-25 | Stop reason: HOSPADM

## 2022-03-25 RX ORDER — SODIUM CHLORIDE 0.9 % (FLUSH) 0.9 %
5-40 SYRINGE (ML) INJECTION AS NEEDED
Status: CANCELLED | OUTPATIENT
Start: 2022-03-25

## 2022-03-25 RX ORDER — FENTANYL CITRATE 50 UG/ML
25 INJECTION, SOLUTION INTRAMUSCULAR; INTRAVENOUS
Status: DISCONTINUED | OUTPATIENT
Start: 2022-03-25 | End: 2022-03-25 | Stop reason: HOSPADM

## 2022-03-25 RX ORDER — GLYCOPYRROLATE 0.2 MG/ML
INJECTION INTRAMUSCULAR; INTRAVENOUS AS NEEDED
Status: DISCONTINUED | OUTPATIENT
Start: 2022-03-25 | End: 2022-03-25 | Stop reason: HOSPADM

## 2022-03-25 RX ORDER — MIDAZOLAM HYDROCHLORIDE 1 MG/ML
INJECTION, SOLUTION INTRAMUSCULAR; INTRAVENOUS
Status: COMPLETED
Start: 2022-03-25 | End: 2022-03-25

## 2022-03-25 RX ORDER — SODIUM CHLORIDE 0.9 % (FLUSH) 0.9 %
5-40 SYRINGE (ML) INJECTION EVERY 8 HOURS
Status: CANCELLED | OUTPATIENT
Start: 2022-03-25

## 2022-03-25 RX ORDER — ONDANSETRON 2 MG/ML
INJECTION INTRAMUSCULAR; INTRAVENOUS AS NEEDED
Status: DISCONTINUED | OUTPATIENT
Start: 2022-03-25 | End: 2022-03-25 | Stop reason: HOSPADM

## 2022-03-25 RX ORDER — OXYCODONE HYDROCHLORIDE 5 MG/1
5 TABLET ORAL
Qty: 20 TABLET | Refills: 0 | Status: SHIPPED | OUTPATIENT
Start: 2022-03-25 | End: 2022-03-30

## 2022-03-25 RX ORDER — LIDOCAINE HYDROCHLORIDE 10 MG/ML
0.1 INJECTION, SOLUTION EPIDURAL; INFILTRATION; INTRACAUDAL; PERINEURAL AS NEEDED
Status: DISCONTINUED | OUTPATIENT
Start: 2022-03-25 | End: 2022-03-25 | Stop reason: HOSPADM

## 2022-03-25 RX ORDER — SODIUM CHLORIDE 9 MG/ML
100 INJECTION, SOLUTION INTRAVENOUS CONTINUOUS
Status: CANCELLED | OUTPATIENT
Start: 2022-03-25 | End: 2022-03-26

## 2022-03-25 RX ORDER — OXYCODONE AND ACETAMINOPHEN 5; 325 MG/1; MG/1
1 TABLET ORAL
Status: DISCONTINUED | OUTPATIENT
Start: 2022-03-25 | End: 2022-03-25 | Stop reason: HOSPADM

## 2022-03-25 RX ORDER — ACETAMINOPHEN 500 MG
1000 TABLET ORAL EVERY 6 HOURS
Status: CANCELLED | OUTPATIENT
Start: 2022-03-25

## 2022-03-25 RX ORDER — LIDOCAINE HYDROCHLORIDE AND EPINEPHRINE 10; 10 MG/ML; UG/ML
INJECTION, SOLUTION INFILTRATION; PERINEURAL AS NEEDED
Status: DISCONTINUED | OUTPATIENT
Start: 2022-03-25 | End: 2022-03-25 | Stop reason: HOSPADM

## 2022-03-25 RX ADMIN — PROPOFOL 200 MG: 10 INJECTION, EMULSION INTRAVENOUS at 09:40

## 2022-03-25 RX ADMIN — Medication 3 MG: at 11:10

## 2022-03-25 RX ADMIN — BUPIVACAINE HYDROCHLORIDE 15 ML: 5 INJECTION, SOLUTION EPIDURAL; INTRACAUDAL; PERINEURAL at 09:10

## 2022-03-25 RX ADMIN — Medication 80 MCG: at 10:57

## 2022-03-25 RX ADMIN — ONDANSETRON HYDROCHLORIDE 4 MG: 2 INJECTION, SOLUTION INTRAMUSCULAR; INTRAVENOUS at 09:50

## 2022-03-25 RX ADMIN — SODIUM CHLORIDE, POTASSIUM CHLORIDE, SODIUM LACTATE AND CALCIUM CHLORIDE 25 ML/HR: 600; 310; 30; 20 INJECTION, SOLUTION INTRAVENOUS at 08:45

## 2022-03-25 RX ADMIN — GLYCOPYRROLATE 0.4 MG: 0.2 INJECTION, SOLUTION INTRAMUSCULAR; INTRAVENOUS at 11:10

## 2022-03-25 RX ADMIN — BUPIVACAINE 10 ML: 13.3 INJECTION, SUSPENSION, LIPOSOMAL INFILTRATION at 09:10

## 2022-03-25 RX ADMIN — MIDAZOLAM HYDROCHLORIDE 3 MG: 1 INJECTION, SOLUTION INTRAMUSCULAR; INTRAVENOUS at 09:08

## 2022-03-25 RX ADMIN — WATER 2 G: 1 INJECTION INTRAMUSCULAR; INTRAVENOUS; SUBCUTANEOUS at 09:33

## 2022-03-25 RX ADMIN — SUCCINYLCHOLINE CHLORIDE 160 MG: 20 INJECTION, SOLUTION INTRAMUSCULAR; INTRAVENOUS at 09:40

## 2022-03-25 RX ADMIN — TRANEXAMIC ACID 1 G: 100 INJECTION, SOLUTION INTRAVENOUS at 10:17

## 2022-03-25 RX ADMIN — LIDOCAINE HYDROCHLORIDE 80 MG: 20 INJECTION, SOLUTION EPIDURAL; INFILTRATION; INTRACAUDAL; PERINEURAL at 09:40

## 2022-03-25 RX ADMIN — DEXAMETHASONE SODIUM PHOSPHATE 8 MG: 4 INJECTION, SOLUTION INTRAMUSCULAR; INTRAVENOUS at 09:50

## 2022-03-25 RX ADMIN — ROCURONIUM BROMIDE 20 MG: 10 INJECTION INTRAVENOUS at 09:50

## 2022-03-25 RX ADMIN — ROCURONIUM BROMIDE 10 MG: 10 INJECTION INTRAVENOUS at 09:40

## 2022-03-25 RX ADMIN — SODIUM CHLORIDE 30 MCG/MIN: 900 INJECTION, SOLUTION INTRAVENOUS at 10:10

## 2022-03-25 NOTE — DISCHARGE INSTRUCTIONS
April Frazier M.D.           Knee & Shoulder Surgery           Sports Medicine             Discharge Instructions: How to [address]  Surgery: Shoulder Arthroplasty  Surgeon:   Susan Perdomo MD  Surgery Date:  3/25/2022  Direct [phone], Office [phone]    For video instructions see our website  [email or web address]    [address] from the [address] [facility], [address] [place] [address] will go home with a sling to immobilize and support your arm while the nerve block is active. You can let your arm hang loosely by your side and extend your elbow. You may gently raise your surgical arm with the help of your good arm. You may also perform Codman dangles and shoulder blade pinching exercises as instructed. Do not put weight on the affected arm. Do not lean on it, and do not hold objects with that hand. In general for the first week keep arm in sling, rest, perform simple stretching exercises and ice your shoulder. Common Post-op Concerns     Hand swelling: Adjust sling to fit with hand slightly above elbow, squeeze a stress ball and do gentle biceps curls to help pump the fluid out of your arm. The forearm will be wrapped with a brown compression wrap, leave this on for 48 hours to help reduce the forearm swelling. You may remove before showering or sooner if the wrap is uncomfortably tight. Bruising: Very common and will subside over 2-3 weeks. Nausea: A common side effect of anesthesia, narcotic medicine, and pain. Take Zofran as needed. Decrease pain medicines when possible. Fever: Somewhat common the first 3 days after surgery, take Tylenol as needed. Sling  The sling is used to immobilize your shoulder while your block is active and for comfort the first 2-3 weeks after surgery. The sling in general is optional and available for you to use as neded.  You may need to adjust the front shoulder strap so that your hand is slightly above your elbow, this will prevent some of the hand swelling that is common after surgery. You may remove the sling when sitting down and allow your arm to rest in your lap. You may take the sling off to shower. To relieve pain:   Use ice/gel packs.  Put the ice pack directly over the wound, or anywhere you are hurting or swollen.  To control pain and swelling, keep ice on regularly, especially after physical activity.  The packs should stay cold for 3-4 hours. When it is not cold anymore, rotate with the packs in the freezer.  Rest for at least 20 minutes between activity or exercises.  You will be sent home with pain medicine. Typically we use oxycodone unless you have an allergy. We encourage Tylenol 1000mg every 6 hours for the first week and then pain medicine when needed.  To keep track of your pain medications, write down what you take and when you take it.  The last dose of pain medication you got in the hospital was:       Medication    Dose    Date & Time             Choose your medications based on the pain scale below:     To keep your pain under control, take Tylenol every 6 hours for 14 days - even if you feel like you dont need it.  For mild to moderate pain (1-6 on pain scale), take one pain pill every 3-4 hours as needed.  For severe pain (7-10 on pain scale), take two pain pills every 3-4 hours as needed.  To prevent nausea, take your pain medications with food. Pain Scale                   As your pain lessens:     Slowly start taking less pain medication. You may do this by waiting longer between doses or by taking smaller doses.  Stop using the pain medications as soon as you no longer need it, usually in 2-3 weeks.  Generally after shoulder surgery, ambulation or walking around and being active is the best prevention for blood clots.      If you are at risk for blood clots due to your inability to walk around or have had blood clots in the past you may take Aspirin 325 mg once a day for 2-3 weeks to lower the chance of developing a blood clot.  If you have a history of GI upset or bleeding:   Do not take non-steroidal anti-inflammatory medications (Ibuprofen, Advil, Motrin, Naproxen, etc.)    Take Pepcid 20 mg twice a day, or a similar home medication, while you are taking a blood thinner. OPSITE (Honeycomb dressing)     Keep your waterproof dressing in place for 7 days. Please remove the dressing after 7 days but leave the surgical glue covering the wound in place until your first post-op appointment.  With your waterproof dressing intact you may shower 48 hours after surgery.  If there is no more drainage from the wound, you may leave it open to the air.  You may remove the brown compression wrap on your forearm after 48 hours, sooner if the wrap is uncomfortably tight.  If you have staples they will be removed at your 1st postop appointment usually about 10 days after surgery.  To increase and promote healing:   Stop Smoking (or at least cut back on       Smoking).  Eat a well-balanced diet (high in protein       and vitamin C).  If you have a poor appetite, drink Ensure, Glucerna, or         [place] Instant Breakfast for the next 30 days.  If you are diabetic, you should control your blood         sugars to prevent infection and help your wound         to heal.                         To prevent constipation, stay active and drink plenty of fluid.  While using pain medications, you should also take stool softeners and laxatives, such as Pericolace and Miralax.  If you are having too many bowel       Movements, then you may need       to stop taking the laxatives.      You should have a bowel movement 3-4 days        after surgery and then at least every other day while       on pain medication.  Take short walks (in your home)         about every 1-2 hours during the day.  Try to increase how far you walk each day.     o NO DRIVING for the first week after surgery. If you are no longer using narcotic pain medicine you may begin driving.  Please call your physician immediately if you have:     Constant bleeding from your wound.  Increasing redness or swelling around your wound (Some warmth, bruising and swelling is normal).  Change in wound drainage (increase in amount, color, or bad smell).  Change in mental status (unusual behavior   Temperature over 101.5 degrees Fahrenheit      Pain or redness in the calf (back of your lower leg)     Increased swelling of the thigh, ankle, calf, or foot.  Emergency: CALL 911 if you have:     Shortness of breath     Chest pain when you cough or taking a deep breath          A follow up appointment card will be given to you or your family member after the surgery. If you are not aware of your follow up time or lost the card, please call the office at [phone].  If you have questions or concerns during normal business hours, you may reach Dr. Josue Luu team at [phone]. If you have immediate concerns or questions you may call the office and reach Dr Josue Luu or another [address] [place] [address] provider who is on call. [phone]          DO NOT TAKE TYLENOL/ACETAMINOPHEN WITH PERCOCET, [address], [address] [address] [facility]. TAKE NARCOTIC PAIN MEDICATIONS WITH FOOD! For the night of surgery, while block is still in effect, start with 1 pain pill at bedtime    Narcotics tend to be constipating and we recommend taking a stool softener such as Colace or Miralax (follow package instructions). If you were given prescriptions, please review the written information on the prescribed medications. DO NOT DRIVE WHILE TAKING NARCOTIC PAIN MEDICATIONS.     CPAP PATIENTS BE SURE TO WEAR MACHINE WHENEVER NAPPING OR SLEEPING DAY/NIGHT OF SURGERY! DISCHARGE SUMMARY from Nurse    The following personal items collected during your admission are returned to you:   Dental Appliance: Dental Appliances: None  Vision: Visual Aid: None  Hearing Aid:    Jewelry: Jewelry: Ring (wife)  Clothing: Clothing: With patient  Other Valuables: Other Valuables: Suitcase,With patient  Valuables sent to safe:        PATIENT INSTRUCTIONS:    After General Anesthesia or Intravenous Sedation, for 24 hours or while taking prescription Narcotics:  · Someone should be with you for the next 24 hours. · For your own safety, a responsible adult must drive you home. · Limit your activities  · Recommended activity: Rest today, up with assistance today. Do not climb stairs or shower unattended for the next 24 hours. · Start with a soft bland diet and advance as tolerated (no nausea) to regular diet. · If you have a sore throat some things that may help are: fluids, warm salt water gargle, or throat lozenges. If this does not improve after several days please follow up with your family physician. · Do not drive and operate hazardous machinery  · Do not make important personal or business decisions  · Do  not drink alcoholic beverages  · If you have not urinated within 8 hours after discharge, please contact your surgeon on call. Report the following to your surgeon:  · Excessive pain, swelling, redness or odor of or around the surgical area  · Temperature over 100.5  · Nausea and vomiting lasting longer than 4 hours or if unable to take medications  · Any signs of decreased circulation or nerve impairment to extremity: change in color, persistent  numbness, tingling, coldness or increase pain    · If you received an upper extremity nerve block, please wear your sling until the block has worn off, then refer to your surgeons post-operative instructions.           If you have had a shoulder block or a block near your collar bone, you may have              symptoms such as:          1. Mild shortness of breath        2. A hoarse voice        3. Blurry vision        4. Unequal pupils        5. Drooping of your face on the same side as the nerve block. These symptoms will disappear as the nerve block wears off. · If you received a lower extremity nerve block, please use your crutches, walker, or cane until the nerve block has worn off, then refer to your surgeons post-operative instructions.    · You will receive a Post Operative Call from one of the Recovery Room Nurses on the day after your surgery to check on you. It is very important for us to know how you are recovering after your surgery. If you have an issue please call your surgeon, do not wait for the post operative call. · You may receive an e-mail or letter in the mail from [employer] regarding your experience with us in the Ambulatory Surgery Unit. Your feedback is valuable to us and we appreciate your participation in the survey. ·   · If the above instructions are not adequate or you are having problems after your surgery, call your physician at their office number. ·   · We wish youre a speedy recovery ? What to do at Home:    *  Please give a list of your current medications to your Primary Care Provider. *  Please update this list whenever your medications are discontinued, doses are      changed, or new medications (including over-the-counter products) are added. *  Please carry medication information at all times in case of emergency situations. If you have not had your influenza or pneumococcal vaccines, please follow up with your primary care physician. The discharge information has been reviewed with the patient and caregiver. The patient and caregiver verbalized understanding. TO PREVENT AN INFECTION      1. [address] YOUR HANDS     To prevent infection, good handwashing is the most important thing you or your caregiver can do.  Wash your hands with soap and water or use the hand  we gave you before you touch any wounds. 2. SHOWER     Use the antibacterial soap we gave you when you take a shower.  Shower with this soap until your wounds are healed.  To reach all areas of your body, you may need someone to help you.  Dont forget to clean your belly button with every shower. 3.  USE CLEAN SHEETS     Use freshly cleaned sheets on your bed after surgery.  To keep the surgery site clean, do not allow pets to sleep with you while your wound is still healing. 4. STOP SMOKING     Stop smoking, or at least cut back on smoking     Smoking slows your healing. 5.  CONTROL YOUR BLOOD SUGAR     High blood sugars slow wound healing.  If you are diabetic, control your blood sugar levels before and after your surgery.

## 2022-03-25 NOTE — PERIOP NOTES
Dr. Rebecca Serna performed nerve block in preop using ultrasound machine to RUE. Pt on CM x3, 02 by NC at 3L, patient responsive when spoken to. Able to answer questions appropriately. Pt did receive Versed 3 mg IV given by Dr. Rebecca Serna for sedation. Pt tolerated procedure well. VSS and will continue to monitor.

## 2022-03-25 NOTE — PERIOP NOTES
Pt. Alert. Denies pain or chill. Discharge instructions reviewed with caregiver and patient. Allowed and answered questions. Tolerating PO fluids. Both state ready for discharge. 1244 Assisted pt to dress and RUE placed in sling. Ambulated without assistance to BR (spotting patient only) but unable to void. Stressed to wife that must void by 8:30pm and call MD and expect to go to urgent care for catheter. Wife stated he drinks large amounts of water and understands. Pt has ice pack at home that straps around shoulder.

## 2022-03-25 NOTE — PERIOP NOTES
Permission received to review discharge instructions and discuss private health information with Esperanza Garcia, wife.

## 2022-03-25 NOTE — ANESTHESIA PROCEDURE NOTES
Peripheral Block    Start time: 3/25/2022 9:05 AM  End time: 3/25/2022 9:14 AM  Performed by: Mendoza Abdi MD  Authorized by: Mendoza Abdi MD       Pre-procedure: Indications: at surgeon's request and post-op pain management    Preanesthetic Checklist: patient identified, risks and benefits discussed, site marked, timeout performed, anesthesia consent given and patient being monitored    Timeout Time: 09:07 EDT          Block Type:   Block Type:   Interscalene  Laterality:  Right  Monitoring:  Standard ASA monitoring, responsive to questions and oxygen  Injection Technique:  Single shot  Procedures: ultrasound guided    Patient Position: supine  Prep: chlorhexidine    Location:  Interscalene  Needle Type:  Stimuplex  Needle Gauge:  22 G  Needle Localization:  Ultrasound guidance  Medication Injected:  Bupivacaine liposome (PF) susp (EXPAREL) infiltration, 10 mL  bupivacaine (PF) (MARCAINE) 0.5% injection, 15 mL  Med Admin Time: 3/25/2022 9:10 AM    Assessment:  Number of attempts:  1  Injection Assessment:  Incremental injection every 5 mL, no paresthesia, ultrasound image on chart, local visualized surrounding nerve on ultrasound, negative aspiration for blood and no intravascular symptoms  Patient tolerance:  Patient tolerated the procedure well with no immediate complications

## 2022-03-25 NOTE — BRIEF OP NOTE
Brief Postoperative Note    Patient: Daron Boone  YOB: 1955  MRN: [id number]    Date of Procedure: 3/25/2022     Pre-Op Diagnosis: RIGHT SHOULDER OSTEOARTHRITIS    Post-Op Diagnosis: Same as preoperative diagnosis. Procedure(s):  RIGHT SHOULDER REVERSE ARTHROPLASTY, AXILLARY NERVE NEUROPLASTY    Surgeon(s):  Susan Perdomo MD    Surgical Assistant: Physician Assistant: LOREN Kelly  Surg Asst-1: Wannetta Clipper    Anesthesia: General     Estimated Blood Loss (mL): less than 913     Complications: None    Specimens: * No specimens in log *     Implants:   Implant Name Type Inv.  Item Serial No.  Lot No. LRB No. Used Action   CEMENT BNE 40GM W/ GENT HI VISC CO - SNA  CEMENT BNE 40GM W/ GENT HI VISC CO NA [facility] [id number] Right 1 Implanted   BASEPLATE RAFAL [id number] HA SHLDR RSP - SNA  BASEPLATE RAFAL [id number] HA SHLDR RSP NA [facility] [id number] Right 1 Implanted   HEAD RAFAL 36MM NEUT W/ RET SCR RSP - SNA  HEAD RAFAL 36MM NEUT W/ RET SCR RSP NA [facility] [id number] Right 1 Implanted   SCREW BNE L14MM DIA5MM TI NONLOCKING FOR RAFAL BASEPLT FIX - SNA  SCREW BNE L14MM DIA5MM TI NONLOCKING FOR RAFAL BASEPLT FIX NA [facility] [id number] Right 1 Implanted   SCREW BNE L18MM DIA5MM TI NONLOCKING FOR RAFAL BASEPLT FIX - SNA  SCREW BNE L18MM DIA5MM TI NONLOCKING FOR RAFAL BASEPLT FIX NA [facility] [id number] Right 1 Implanted   SCREW BNE L14MM DIA5MM TI NONLOCKING FOR RAFAL BASEPLT FIX - SNA  SCREW BNE L14MM DIA5MM TI NONLOCKING FOR RAFAL BASEPLT FIX NA [facility] [id number] Right 1 Implanted   SCREW BNE L22MM DIA5MM TI NONLOCKING FOR RAFAL BASEPLT FIX - SNA  SCREW BNE L22MM DIA5MM TI NONLOCKING FOR RAFAL BASEPLT FIX NA [facility] SURG[facility] [id number] Right 1 Implanted   RESTRICTOR CARROLL [id number] BIOABSRB HIP PLUG CLEARCUT - SNA  RESTRICTOR CARROLL [id number] BIOABSRB HIP PLUG CLEARCUT NA [facility] [id number] Right 1 Implanted   STEM HUM MONOBLOCK 8X108 MM PRIMARY RSP - SNA  STEM HUM MONOBLOCK 8X108 MM PRIMARY RSP NA [facility] [id number] Right 1 Implanted   INSERT HUM L36MM MONOBLOCK STD SOCK RSP HXE + - SNA  INSERT HUM L36MM MONOBLOCK STD SOCK RSP HXE + NA [employer] [facility]_ [id number] Right 1 Implanted       Drains: * No LDAs found *    Findings: see op note    Electronically Signed by LOREN Santizo on 3/25/2022 at 11:29 AM

## 2022-03-25 NOTE — ANESTHESIA PREPROCEDURE EVALUATION
Relevant Problems   No relevant active problems       Anesthetic History          Comments: Slow to wake     Review of Systems / Medical History  Patient summary reviewed, nursing notes reviewed and pertinent labs reviewed    Pulmonary  Within defined limits                 Neuro/Psych   Within defined limits           Cardiovascular    Hypertension              Exercise tolerance: >4 METS  Comments: currently off BP med    03/22 EKG= SR   GI/Hepatic/Renal     GERD (occasional, PRN Tums)           Endo/Other        Obesity and arthritis ( gout)     Other Findings   Comments: Right shoulder OA    Chronic pain in shoulder         Physical Exam    Airway  Mallampati: II  TM Distance: 4 - 6 cm  Neck ROM: normal range of motion   Mouth opening: Normal     Cardiovascular    Rhythm: regular  Rate: normal         Dental    Dentition: Caps/crowns  Comments:  On molars & upper front x1   Pulmonary  Breath sounds clear to auscultation               Abdominal  GI exam deferred       Other Findings            Anesthetic Plan    ASA: 2  Anesthesia type: general and regional - interscalene block      Post-op pain plan if not by surgeon: peripheral nerve block single    Induction: Intravenous  Anesthetic plan and risks discussed with: Patient

## 2022-03-25 NOTE — OP NOTES
Name:Tre Caicedo    Surgery Date: 3/25/2022     Date of Dictation: 3/25/2022    Admitting Pre-OP Diagnosis: RIGHT SHOULDER OSTEOARTHRITIS    Post-Op Diagnosis: same, loose body 1.5 cm    Primary Procedure: Right Reverse TSA including glenosphere, axillary nerve neuroplasty, removal large loose body, autogenous bone grafting glenoid     Surgeon: Sada Siegel M.D. Asst: LOREN Mtz    Antibiotics: Weight appropriate IV Ancef    Blood Loss: 50cc    Implants: DJO RSP Reverse Total Shoulder components including glenosphere 36 N, 8 monoblock, 2 mm poly    Specimens: Humeral Head, proximal biceps tendon    Anesthesia: GET with Interscalene Block    Indication: This patient has end-stage osteoarthritis/ rotator cuff arthropathy of the shoulder with failure to relieve symptoms with nonsurgical treatment and comes in for Reverse Shoulder arthroplasty including glenosphere. This is a complex surgical procedure requiring an assistant for patient positioning, for wound closure, but critically for assistance with retraction and exposure of the glenoid to allow for axillary nerve dissection and for exposure of the glenoid which is the critical steps shoulder arthroplasty and an assistant is used. Indication for Reverse is 13 degrees retroversion, 14 degrees superior inclination    Procedure:  Patient is brought in the operating theater and intubated, was given a preoperative interscalene block with X per all, weight appropriate IV antibiotics. Patient is positioned in the beach chair position, all bony prominences padded, the affected shoulder was prepped and draped and after time out we infiltrated the skin with 1% lidocaine with epi then made a skin incision dissecting down to the cephalic vein. The deltoid and cephalic vein or taken laterally with subdeltoid dissection done and after scar was excised a subdeltoid retractor was placed. The short flexors is flexors were retracted medially.   The fascia just lateral to the short flexors is incised and a retractor placed underneath the subscapularis exposing the 3 vessels at the bottom of the subscapularis. The biceps is removed from the bicipital groove and tenodesed to the subscap tendon using 2. FiberWire sutures, and then the proximal biceps is excised. Then the subscapularis is released off of the lesser tuberosity including an inferior capsule release off the humeral metaphysis just distal to the articular cartilage going all the way back to 7:00 a.m. while the axillary nerve was protected. The status of the rotator cuff is intact. Glenoid retroversion and superior inclination were measured on preoperative CT scan. With the proximal humerus dislocated a 30 degree retroverted cut is made. Then rigid reamers used up to a size 8 then broaches up to a size 8 in 30 degrees retroversion then a pineapple stick placed with the conical reamers removing proximal humeral bone using the conical reamer small size small then size medium. At this point a [place] Raji as placed posterior inferiorly displacing the proximal humerus exposing the glenoid. Traction is placed on the subscap rolled edge and the interval developed behind the subscap releasing the anterior capsule down to 5:00 a.m. [employer] The nerve is then dissected from the inferior subscap to the posterior humeral shaft and after this dissection is completed in the nerve is fully exposed we were able to excise the inferior and anterior capsule by putting Kaiden Comings retractors on the capsule peeling the soft tissues off of the capsule and then excising the capsule using curved Hyde scissors and a Bovie on the rim of the glenoid. This is the critical step in shoulder arthroplasty that allows for posterior subluxation of the proximal humerus out of the way to allow for appropriate full exposure of the glenoid. The labrum is excised 360 degrees around the glenoid.   At this point using the preoperative CT measurements a central guide pin is placed so that the net inclination of the glenosphere old be 10 degrees below neutral.  These calculations are made using preoperative CT measurements so that the total inclination is 10 degrees down. Retroversion is corrected to neutral.  Small medium and large reamers were used, soft tissue removed from the appropriate the glenoid, then the base plate screwed into place and locking screws placed. Then the glenosphere is placed onto the base plate with a locking screw over wedge bone graft placed superiorly taken from humeral head. Attention is then turned back to the proximal humerus, the size 8 mono block stem cemented in place and once the cement his dry humeral poly inserts trialed until the correct soft tissue tensions obtained without impingement with good soft tissue stability. Copiously irrigated with bacitracin saline pulsatile lavage, Exparel infiltrated into the soft tissues including into the posterior capsule. Incision is closed in layers patient taken to recovery room in stable condition.

## 2022-03-25 NOTE — ANESTHESIA POSTPROCEDURE EVALUATION
Procedure(s):  RIGHT SHOULDER REVERSE ARTHROPLASTY, AXILLARY NERVE NEUROPLASTY. general, regional    Anesthesia Post Evaluation      Multimodal analgesia: multimodal analgesia used between 6 hours prior to anesthesia start to PACU discharge  Patient location during evaluation: PACU  Patient participation: complete - patient participated  Level of consciousness: awake and alert  Pain score: 0  Airway patency: patent  Anesthetic complications: no  Cardiovascular status: acceptable  Respiratory status: acceptable  Hydration status: acceptable  Comments: Pt has interscalene block with Exparel. Sling postop.   Will be d/c'd home  Post anesthesia nausea and vomiting:  none  Final Post Anesthesia Temperature Assessment:  Normothermia (36.0-37.5 degrees C)      INITIAL Post-op Vital signs:   Vitals Value Taken Time   /76 03/25/22 1201   Temp 36.6 °C (97.8 °F) 03/25/22 1132   Pulse 81 03/25/22 1201   Resp 18 03/25/22 1201   SpO2 95 % 03/25/22 1201

## 2022-07-14 ENCOUNTER — TRANSCRIBE ORDER (OUTPATIENT)
Dept: SCHEDULING | Age: 67
End: 2022-07-14

## 2022-07-14 ENCOUNTER — HOSPITAL ENCOUNTER (OUTPATIENT)
Dept: CT IMAGING | Age: 67
Discharge: HOME | End: 2022-07-14
Attending: INTERNAL MEDICINE
Payer: MEDICARE

## 2022-07-14 DIAGNOSIS — R91.8 MULTIPLE PULMONARY NODULES: Primary | ICD-10-CM

## 2022-07-14 DIAGNOSIS — R91.8 MULTIPLE PULMONARY NODULES: ICD-10-CM

## 2022-07-14 PROCEDURE — 71250 CT THORAX DX C-: CPT

## 2022-08-19 ENCOUNTER — OFFICE VISIT (OUTPATIENT)
Dept: FAMILY MEDICINE CLINIC | Age: 67
End: 2022-08-19
Payer: MEDICARE

## 2022-08-19 VITALS
TEMPERATURE: 97.5 F | OXYGEN SATURATION: 98 % | DIASTOLIC BLOOD PRESSURE: 90 MMHG | WEIGHT: 241.38 LBS | RESPIRATION RATE: 18 BRPM | SYSTOLIC BLOOD PRESSURE: 150 MMHG | BODY MASS INDEX: 33.79 KG/M2 | HEIGHT: 71 IN

## 2022-08-19 DIAGNOSIS — Z00.00 MEDICARE ANNUAL WELLNESS VISIT, SUBSEQUENT: Primary | ICD-10-CM

## 2022-08-19 DIAGNOSIS — Z12.11 COLON CANCER SCREENING: ICD-10-CM

## 2022-08-19 DIAGNOSIS — I10 PRIMARY HYPERTENSION: ICD-10-CM

## 2022-08-19 DIAGNOSIS — Z13.39 SCREENING FOR ALCOHOLISM: ICD-10-CM

## 2022-08-19 DIAGNOSIS — M17.31 UNILATERAL POST-TRAUMATIC OSTEOARTHRITIS, RIGHT KNEE: ICD-10-CM

## 2022-08-19 DIAGNOSIS — S61.233A PUNCTURE WOUND OF LEFT MIDDLE FINGER: ICD-10-CM

## 2022-08-19 DIAGNOSIS — Z13.31 SCREENING FOR DEPRESSION: ICD-10-CM

## 2022-08-19 DIAGNOSIS — Z23 ENCOUNTER FOR IMMUNIZATION: ICD-10-CM

## 2022-08-19 DIAGNOSIS — Z11.59 ENCOUNTER FOR HEPATITIS C SCREENING TEST FOR LOW RISK PATIENT: ICD-10-CM

## 2022-08-19 PROCEDURE — G0009 ADMIN PNEUMOCOCCAL VACCINE: HCPCS | Performed by: FAMILY MEDICINE

## 2022-08-19 PROCEDURE — G0439 PPPS, SUBSEQ VISIT: HCPCS | Performed by: FAMILY MEDICINE

## 2022-08-19 PROCEDURE — 90714 TD VACC NO PRESV 7 YRS+ IM: CPT | Performed by: FAMILY MEDICINE

## 2022-08-19 PROCEDURE — 90677 PCV20 VACCINE IM: CPT | Performed by: FAMILY MEDICINE

## 2022-08-19 PROCEDURE — 99213 OFFICE O/P EST LOW 20 MIN: CPT | Performed by: FAMILY MEDICINE

## 2022-08-19 PROCEDURE — G0444 DEPRESSION SCREEN ANNUAL: HCPCS | Performed by: FAMILY MEDICINE

## 2022-08-19 RX ORDER — LISINOPRIL 10 MG/1
10 TABLET ORAL DAILY
Qty: 30 TABLET | Refills: 11 | Status: CANCELLED | OUTPATIENT
Start: 2022-08-19

## 2022-08-19 RX ORDER — LOSARTAN POTASSIUM 25 MG/1
25 TABLET ORAL DAILY
Qty: 30 TABLET | Refills: 11 | Status: SHIPPED | OUTPATIENT
Start: 2022-08-19

## 2022-08-19 NOTE — PROGRESS NOTES
Vida Winter is a 75 y.o. male    HPI:  DJD  Symptoms include no sx. His shoulder and knee have done well with surgical mgmt and phys therapy with [facility]. No longer needs any meds or extra tx. Hypertension. Blood pressures are worsening. Management at last visit included monitoring. Symptoms include no symptoms. Patient denies chest pain, palpitations, peripheral edema. Lab review:   Lab Results   Component Value Date/Time    Sodium 134 (L) 03/15/2022 01:33 PM    Potassium 3.8 03/15/2022 01:33 PM    Chloride 103 03/15/2022 01:33 PM    CO2 25 03/15/2022 01:33 PM    Anion gap 6 03/15/2022 01:33 PM    Glucose 88 03/15/2022 01:33 PM    BUN 13 03/15/2022 01:33 PM    Creatinine 0.92 03/15/2022 01:33 PM    BUN/Creatinine ratio 14 03/15/2022 01:33 PM    GFR est AA >60 03/15/2022 01:33 PM    GFR est non-AA >60 03/15/2022 01:33 PM    Calcium 9.8 03/15/2022 01:33 PM     PMH, SH, Medications/Allergies: reviewed, on chart. Current Outpatient Medications   Medication Sig    ondansetron hcl (Zofran) 4 mg tablet Take 1 Tablet by mouth every eight (8) hours as needed for Nausea. gabapentin (NEURONTIN) 300 mg capsule Take 1 Capsule by mouth nightly. Max Daily Amount: 300 mg.    meloxicam (MOBIC) 15 mg tablet Take 1 Tablet by mouth daily. No current facility-administered medications for this visit.       ROS:  Constitutional: No fever, chills or abnormal weight loss  Respiratory: No cough, SOB   CV: No chest pain or Palpitations    Visit Vitals  BP (!) 150/90   Temp 97.5 °F (36.4 °C) (Temporal)   Resp 18   Ht 5' 11\" (1.803 m)   Wt 241 lb 6 oz (109.5 kg)   SpO2 98%   BMI 33.66 kg/m²     Wt Readings from Last 3 Encounters:   08/19/22 241 lb 6 oz (109.5 kg)   03/25/22 231 lb (104.8 kg)   03/15/22 233 lb 11 oz (106 kg)     BP Readings from Last 3 Encounters:   08/19/22 (!) 150/90   03/25/22 135/80   03/15/22 (!) 143/81     Physical Examination: General appearance - alert, well appearing, and in no distress  Mental status - alert, oriented to person, place, and time  Eyes - pupils equal and reactive, extraocular eye movements intact  ENT - bilateral external ears and nose normal. Normal lips  Neck - supple, no significant adenopathy, no thyromegaly or mass  Chest - clear to auscultation, no wheezes, rales or rhonchi, symmetric air entry  Heart - normal rate, regular rhythm, normal S1, S2, no murmurs, rubs, clicks or gallops  Extremities - peripheral pulses normal, no pedal edema, no clubbing or cyanosis. Laceration/puncture seen to LMF, healing well    A/p:  Stage 1 HTN  Worsening control onTLCs. Weight climbing. Needs to start treatment. ready. Start losartan 25mg/d, didn't do well on lisinopril in past. Check labs, adj PRN. DJD  Minimal sx now. Monitor.    ______________________________________________________________________    Matt Cintron is a 75 y.o. male and presents for annual Medicare Wellness Visit. Problem List: Reviewed with patient and discussed risk factors. Patient Active Problem List   Diagnosis Code    Right rotator cuff tear arthropathy M75.101, M12.811    Primary hypertension I10       Current medical providers:  Patient Care Team:  Olivia Oliva MD as PCP - General (Family Medicine)  Olivia Oliav MD as PCP - [facility] Empaneled Provider    PM, , Medications/Allergies: reviewed, on chart. Male Alcohol Screening: On any occasion during the past 3 months, have you had more than 4 drinks containing alcohol? Yes  Do you average more than 14 drinks per week? Yes. Gentle counseling given re: safer ETOH consumption. ROS:  Constitutional: No fever, chills or weight loss  Respiratory: No cough, SOB   CV: No chest pain or Palpitations    Objective:  Visit Vitals  BP (!) 150/90   Temp 97.5 °F (36.4 °C) (Temporal)   Resp 18   Ht 5' 11\" (1.803 m)   Wt 241 lb 6 oz (109.5 kg)   SpO2 98%   BMI 33.66 kg/m²    Body mass index is 33.66 kg/m².     Assessment of cognitive impairment: Alert and oriented x 3  Mini-cog: Nl Clock, 2/3 recall    Depression Screen:   3 most recent PHQ Screens 8/19/2022   Little interest or pleasure in doing things Not at all   Feeling down, depressed, irritable, or hopeless Not at all   Total Score PHQ 2 0     Depression screening [id number] performed. Fall Risk Assessment:    Fall Risk Assessment, last 12 mths 8/19/2022   Able to walk? Yes   Fall in past 12 months? 0   Do you feel unsteady? 0   Are you worried about falling 0       Functional Ability:   Does the patient exhibit a steady gait? Yes   How long did it take the patient to get up and walk from a sitting position? 1s   Is the patient self reliant?  (ie can do own laundry, meals, household chores)  Yes     Does the patient handle his/her own medications? Yes     Does the patient handle his/her own money? Yes     Is the patients home safe (ie good lighting, handrails on stairs and bath, etc.)? Yes     Did you notice or did patient express any hearing difficulties? Yes- has hearing aides, but doesn't want to wear them     Did you notice or did patient express any vision difficulties? no       Advance Care Planning:   Patient was offered the opportunity to discuss advance care planning:  Yes     Does patient have an Advance Directive:  Yes   If no, did you provide information on Caring Connections? NA       Plan:      Orders Placed This Encounter    Depression Screen Annual    PNEUMOCOCCAL, PCV20, PREVNAR 20, (AGE 25 YRS+), IM, PF    TETANUS AND DIPHTHERIA TOXOIDS (TD) ADSORBED, PRES.  FREE, IN INDIVIDS. >=7, IM    METABOLIC PANEL, COMPREHENSIVE    LIPID PANEL    HEPATITIS C AB, RFLX TO QT BY PCR    COLOGUARD TEST (FECAL DNA COLORECTAL CANCER SCREENING)    ADMIN PNEUMOCOCCAL VACCINE    losartan (COZAAR) 25 mg tablet       Health Maintenance   Topic Date Due    Hepatitis C Screening  Never done    DTaP/Tdap/Td series (1 - Tdap) Never done    Lipid Screen  Never done    Colorectal Cancer Screening Combo Never done    Shingrix Vaccine Age 50> (1 of 2) Never done    Pneumococcal 65+ years (1 - PCV) Never done    COVID-19 Vaccine (2 - Booster for Beth series) 06/22/2021    Depression Screen  02/26/2022    Flu Vaccine (1) 09/01/2022    Medicare Yearly Exam  08/20/2023       *Patient verbalized understanding and agreement with the plan. A copy of the After Visit Summary with personalized health plan was given to the patient today. HCM:  Hep C ab due, check today. Colon check due, cologuard desired. [address]. Immun: Prevnar 20 today. Td today. Shingrix at followup.

## 2022-08-19 NOTE — PROGRESS NOTES
Visit Vitals  BP (!) 150/90   Temp 97.5 °F (36.4 °C) (Temporal)   Resp 18   Ht 5' 11\" (1.803 m)   Wt 241 lb 6 oz (109.5 kg)   SpO2 98%   BMI 33.66 kg/m²     Chief Complaint   Patient presents with    New Patient     1. Have you been to the ER, urgent care clinic since your last visit? Hospitalized since your last visit? No    2. Have you seen or consulted any other health care providers outside of the [address] [place] [address] since your last visit? Include any pap smears or colon screening.  No

## 2022-08-23 LAB
ALBUMIN SERPL-MCNC: 4.2 G/DL (ref 3.8–4.8)
ALBUMIN/GLOB SERPL: 1.4 {RATIO} (ref 1.2–2.2)
ALP SERPL-CCNC: 69 IU/L (ref 44–121)
ALT SERPL-CCNC: 33 IU/L (ref 0–44)
AST SERPL-CCNC: 23 IU/L (ref 0–40)
BILIRUB SERPL-MCNC: 0.4 MG/DL (ref 0–1.2)
BUN SERPL-MCNC: 14 MG/DL (ref 8–27)
BUN/CREAT SERPL: 14 (ref 10–24)
CALCIUM SERPL-MCNC: 8.9 MG/DL (ref 8.6–10.2)
CHLORIDE SERPL-SCNC: 103 MMOL/L (ref 96–106)
CHOLEST SERPL-MCNC: 217 MG/DL (ref 100–199)
CO2 SERPL-SCNC: 17 MMOL/L (ref 20–29)
CREAT SERPL-MCNC: 1.01 MG/DL (ref 0.76–1.27)
EGFR: 81 ML/MIN/1.73
GLOBULIN SER CALC-MCNC: 3 G/DL (ref 1.5–4.5)
GLUCOSE SERPL-MCNC: 89 MG/DL (ref 65–99)
HCV AB S/CO SERPL IA: <0.1 S/CO RATIO (ref 0–0.9)
HCV AB SERPL QL IA: NORMAL
HDLC SERPL-MCNC: 36 MG/DL
LDLC SERPL CALC-MCNC: 150 MG/DL (ref 0–99)
POTASSIUM SERPL-SCNC: 3.7 MMOL/L (ref 3.5–5.2)
PROT SERPL-MCNC: 7.2 G/DL (ref 6–8.5)
SODIUM SERPL-SCNC: 139 MMOL/L (ref 134–144)
TRIGL SERPL-MCNC: 168 MG/DL (ref 0–149)
VLDLC SERPL CALC-MCNC: 31 MG/DL (ref 5–40)

## 2022-08-23 NOTE — PROGRESS NOTES
Labs good except for elevated cholesterol. Heart disease risk is very high, almost 1 in 3 chance of heart attack or stroke in next 10 years. Need better blood pressure control, better cholesterol control. The new losartan should help a lot, but should also strongly consider a medium power cholesterol pill like lipitor 20mg if you want to maximally improve your heart risk and stroke risk. Let me know and I can order, a good control regimen should get your risk down to about 1:10.

## 2022-08-24 LAB — COLOGUARD TEST, EXTERNAL: NEGATIVE

## 2022-08-31 ENCOUNTER — DOCUMENTATION ONLY (OUTPATIENT)
Dept: FAMILY MEDICINE CLINIC | Age: 67
End: 2022-08-31

## 2023-05-24 RX ORDER — LOSARTAN POTASSIUM 25 MG/1
25 TABLET ORAL DAILY
COMMUNITY
Start: 2022-08-19

## 2023-08-09 RX ORDER — LOSARTAN POTASSIUM 25 MG/1
TABLET ORAL
Qty: 30 TABLET | Refills: 0 | Status: SHIPPED | OUTPATIENT
Start: 2023-08-09 | End: 2023-08-11

## 2023-08-11 ENCOUNTER — OFFICE VISIT (OUTPATIENT)
Age: 68
End: 2023-08-11

## 2023-08-11 VITALS
OXYGEN SATURATION: 97 % | RESPIRATION RATE: 18 BRPM | SYSTOLIC BLOOD PRESSURE: 139 MMHG | HEART RATE: 72 BPM | DIASTOLIC BLOOD PRESSURE: 90 MMHG | WEIGHT: 241.4 LBS | TEMPERATURE: 98 F | HEIGHT: 71 IN | BODY MASS INDEX: 33.8 KG/M2

## 2023-08-11 DIAGNOSIS — Z00.00 MEDICARE ANNUAL WELLNESS VISIT, SUBSEQUENT: ICD-10-CM

## 2023-08-11 DIAGNOSIS — E78.2 MIXED HYPERLIPIDEMIA: ICD-10-CM

## 2023-08-11 DIAGNOSIS — I10 ESSENTIAL (PRIMARY) HYPERTENSION: Primary | ICD-10-CM

## 2023-08-11 DIAGNOSIS — H90.3 SENSORINEURAL HEARING LOSS (SNHL) OF BOTH EARS: ICD-10-CM

## 2023-08-11 RX ORDER — LOSARTAN POTASSIUM 50 MG/1
50 TABLET ORAL DAILY
Qty: 90 TABLET | Refills: 3 | Status: SHIPPED | OUTPATIENT
Start: 2023-08-11

## 2023-08-11 SDOH — ECONOMIC STABILITY: HOUSING INSECURITY
IN THE LAST 12 MONTHS, WAS THERE A TIME WHEN YOU DID NOT HAVE A STEADY PLACE TO SLEEP OR SLEPT IN A SHELTER (INCLUDING NOW)?: NO

## 2023-08-11 SDOH — ECONOMIC STABILITY: FOOD INSECURITY: WITHIN THE PAST 12 MONTHS, YOU WORRIED THAT YOUR FOOD WOULD RUN OUT BEFORE YOU GOT MONEY TO BUY MORE.: NEVER TRUE

## 2023-08-11 SDOH — ECONOMIC STABILITY: INCOME INSECURITY: HOW HARD IS IT FOR YOU TO PAY FOR THE VERY BASICS LIKE FOOD, HOUSING, MEDICAL CARE, AND HEATING?: NOT HARD AT ALL

## 2023-08-11 SDOH — ECONOMIC STABILITY: FOOD INSECURITY: WITHIN THE PAST 12 MONTHS, THE FOOD YOU BOUGHT JUST DIDN'T LAST AND YOU DIDN'T HAVE MONEY TO GET MORE.: NEVER TRUE

## 2023-08-11 ASSESSMENT — MINI MENTAL STATE EXAM
WHAT FLOOR ARE WE ON [IN FACILITY]?/ WHAT ROOM ARE WE IN [IN HOME]?: 1
HAND THE PERSON A PENCIL AND PAPER. SAY: WRITE ANY COMPLETE SENTENCE ON THAT
PIECE OF PAPER. (NOTE: THE SENTENCE MUST MAKE SENSE. IGNORE SPELLING ERRORS): 1
SHOW: WRISTWATCH [OBJECT] ASK: WHAT IS THIS CALLED?: 1
WHAT MONTH IS THIS?: 1
SAY: I WOULD LIKE YOU TO REPEAT THIS PHRASE AFTER ME: NO IFS, ANDS, OR BUTS.: 1
SAY: I WOULD LIKE YOU TO COUNT BACKWARD FROM 100 BY SEVENS: 5
SAY: PUT THE PAPER DOWN ON THE FLOOR, SCORE IF PAPER IS PLACED BACK ON FLOOR: 1
WHICH SEASON IS THIS?: 1
WHAT IS TODAY'S DATE?: 1
NOW WHAT WERE THE THREE OBJECTS I ASKED YOU TO REMEMBER?: 2
SAY: I AM GOING TO NAME THREE OBJECTS. WHEN I AM FINISHED, I WANT YOU TO REPEAT
THEM. REMEMBER WHAT THEY ARE BECAUSE I AM GOING TO ASK YOU TO NAME THEM AGAIN IN
A FEW MINUTES.  SAY THE FOLLOWING WORDS SLOWLY AT 1-SECOND INTERVALS - BALL/ CAR/ MAN [ITERATIONS FOR REPEAT ADMINISTRATION]: 3
WHAT CITY/TOWN ARE WE IN?: 1
WHAT DAY OF THE WEEK IS THIS?: 1
SAY: FOLD THE PAPER IN HALF ONCE WITH BOTH HANDS, SCORE IF PAPER IS CORRECTLY FOLDED IN HALF.: 1
SUM ALL MMSE QUESTIONS FOR TOTAL SCORE [OUT OF 30].: 29
WHAT COUNTRY ARE WE IN?: 1
WHAT STATE [OR PROVINCE] ARE WE IN?: 1
ASK THE PERSON IF HE IS RIGHT OR LEFT-HANDED. TAKE A PIECE OF PAPER AND HOLD IT UP IN
FRONT OF THE PERSON. SAY: TAKE THIS PAPER IN YOUR RIGHT/LEFT HAND (WHICHEVER IS NON-
DOMINANT), SCORE IF PAPER IS PICKED UP IN CORRECT HAND.: 1
SAY: READ THE WORDS ON THE PAGE AND THEN DO WHAT IT SAYS. THEN HAND THE PERSON
THE SHEET WITH CLOSE YOUR EYES ON IT. IF THE SUBJECT READS AND DOES NOT CLOSE THEIR EYES, REPEAT UP TO THREE TIMES. SCORE ONLY IF SUBJECT CLOSES EYES.: 1
WHAT IS THE NAME OF THIS BUILDING [IN FACILITY]?/WHAT IS THE STREET ADDRESS OF THIS HOUSE [IN HOME]?: 1
SHOW: PENCIL [OBJECT] ASK: WHAT IS THIS CALLED?: 1
WHAT YEAR IS THIS?: 1
PLACE DESIGN, ERASER AND PENCIL IN FRONT OF THE PERSON.  SAY:  COPY THIS DESIGN PLEASE.  SHOW: DESIGN. ALLOW: MULTIPLE TRIES. WAIT UNTIL PERSON IS FINISHED AND HANDS IT BACK. SCORE: ONLY FOR DIAGRAM WITH 4-SIDED FIGURE BETWEEN TWO 5-SIDED FIGURES: 1

## 2023-08-11 ASSESSMENT — PATIENT HEALTH QUESTIONNAIRE - PHQ9
SUM OF ALL RESPONSES TO PHQ QUESTIONS 1-9: 0
SUM OF ALL RESPONSES TO PHQ9 QUESTIONS 1 & 2: 0
1. LITTLE INTEREST OR PLEASURE IN DOING THINGS: 0
SUM OF ALL RESPONSES TO PHQ QUESTIONS 1-9: 0
SUM OF ALL RESPONSES TO PHQ QUESTIONS 1-9: 0
2. FEELING DOWN, DEPRESSED OR HOPELESS: 0
SUM OF ALL RESPONSES TO PHQ QUESTIONS 1-9: 0

## 2023-08-11 ASSESSMENT — LIFESTYLE VARIABLES
HOW MANY STANDARD DRINKS CONTAINING ALCOHOL DO YOU HAVE ON A TYPICAL DAY: 1 OR 2
HOW OFTEN DURING THE LAST YEAR HAVE YOU NEEDED AN ALCOHOLIC DRINK FIRST THING IN THE MORNING TO GET YOURSELF GOING AFTER A NIGHT OF HEAVY DRINKING: 0
HOW OFTEN DURING THE LAST YEAR HAVE YOU BEEN UNABLE TO REMEMBER WHAT HAPPENED THE NIGHT BEFORE BECAUSE YOU HAD BEEN DRINKING: 0
HAVE YOU OR SOMEONE ELSE BEEN INJURED AS A RESULT OF YOUR DRINKING: 0
HOW OFTEN DURING THE LAST YEAR HAVE YOU FOUND THAT YOU WERE NOT ABLE TO STOP DRINKING ONCE YOU HAD STARTED: 0
HAS A RELATIVE, FRIEND, DOCTOR, OR ANOTHER HEALTH PROFESSIONAL EXPRESSED CONCERN ABOUT YOUR DRINKING OR SUGGESTED YOU CUT DOWN: 0
HOW OFTEN DURING THE LAST YEAR HAVE YOU NEEDED AN ALCOHOLIC DRINK FIRST THING IN THE MORNING TO GET YOURSELF GOING AFTER A NIGHT OF HEAVY DRINKING: 0
HOW OFTEN DURING THE LAST YEAR HAVE YOU FAILED TO DO WHAT WAS NORMALLY EXPECTED FROM YOU BECAUSE OF DRINKING: 0
HOW OFTEN DURING THE LAST YEAR HAVE YOU HAD A FEELING OF GUILT OR REMORSE AFTER DRINKING: 0
HOW OFTEN DURING THE LAST YEAR HAVE YOU FOUND THAT YOU WERE NOT ABLE TO STOP DRINKING ONCE YOU HAD STARTED: 0
HOW OFTEN DURING THE LAST YEAR HAVE YOU BEEN UNABLE TO REMEMBER WHAT HAPPENED THE NIGHT BEFORE BECAUSE YOU HAD BEEN DRINKING: 0
HAS A RELATIVE, FRIEND, DOCTOR, OR ANOTHER HEALTH PROFESSIONAL EXPRESSED CONCERN ABOUT YOUR DRINKING OR SUGGESTED YOU CUT DOWN: 2
HOW OFTEN DURING THE LAST YEAR HAVE YOU FAILED TO DO WHAT WAS NORMALLY EXPECTED FROM YOU BECAUSE OF DRINKING: 0
HAVE YOU OR SOMEONE ELSE BEEN INJURED AS A RESULT OF YOUR DRINKING: 0
HOW OFTEN DO YOU HAVE A DRINK CONTAINING ALCOHOL: 4 OR MORE TIMES A WEEK

## 2023-08-11 NOTE — PATIENT INSTRUCTIONS
[id number] [employer]wise, [employer] Care instructions adapted under license by [facility] ([facility]). If you have questions about a medical condition or this instruction, always ask your healthcare professional. [address] any warranty or liability for your use of this information. A Healthy Heart: Care Instructions  Your Care Instructions     Coronary artery disease, also called heart disease, occurs when a substance called plaque builds up in the vessels that supply oxygen-rich blood to your heart muscle. This can narrow the blood vessels and reduce blood flow. A heart attack happens when blood flow is completely blocked. A high-fat diet, smoking, and other factors increase the risk of heart disease. Your doctor has found that you have a chance of having heart disease. You can do lots of things to keep your heart healthy. It may not be easy, but you can change your diet, exercise more, and quit smoking. These steps really work to lower your chance of heart disease. Follow-up care is a key part of your treatment and safety. Be sure to make and go to all appointments, and call your doctor if you are having problems. It's also a good idea to know your test results and keep a list of the medicines you take. How can you care for yourself at home? Diet    Use less salt when you cook and eat. This helps lower your blood pressure. Taste food before salting. Add only a little salt when you think you need it. With time, your taste buds will adjust to less salt. Eat fewer snack items, fast foods, canned soups, and other high-salt, high-fat, processed foods. Read food labels and try to avoid saturated and trans fats. They increase your risk of heart disease by raising cholesterol levels. Limit the amount of solid fat-butter, margarine, and shortening-you eat. Use olive, peanut, or canola oil when you cook. Bake, broil, and steam foods instead of frying them.      Eat a variety of

## 2023-08-12 LAB
ALBUMIN SERPL-MCNC: 3.9 G/DL (ref 3.5–5)
ALBUMIN/GLOB SERPL: 1.1 (ref 1.1–2.2)
ALP SERPL-CCNC: 69 U/L (ref 45–117)
ALT SERPL-CCNC: 50 U/L (ref 12–78)
ANION GAP SERPL CALC-SCNC: 7 MMOL/L (ref 5–15)
AST SERPL-CCNC: 23 U/L (ref 15–37)
BILIRUB SERPL-MCNC: 0.5 MG/DL (ref 0.2–1)
BUN SERPL-MCNC: 13 MG/DL (ref 6–20)
BUN/CREAT SERPL: 14 (ref 12–20)
CALCIUM SERPL-MCNC: 9.2 MG/DL (ref 8.5–10.1)
CHLORIDE SERPL-SCNC: 108 MMOL/L (ref 97–108)
CHOLEST SERPL-MCNC: 210 MG/DL
CO2 SERPL-SCNC: 24 MMOL/L (ref 21–32)
CREAT SERPL-MCNC: 0.92 MG/DL (ref 0.7–1.3)
GLOBULIN SER CALC-MCNC: 3.6 G/DL (ref 2–4)
GLUCOSE SERPL-MCNC: 95 MG/DL (ref 65–100)
HDLC SERPL-MCNC: 36 MG/DL
HDLC SERPL: 5.8 (ref 0–5)
LDLC SERPL CALC-MCNC: 141.2 MG/DL (ref 0–100)
POTASSIUM SERPL-SCNC: 4.2 MMOL/L (ref 3.5–5.1)
PROT SERPL-MCNC: 7.5 G/DL (ref 6.4–8.2)
SODIUM SERPL-SCNC: 139 MMOL/L (ref 136–145)
TRIGL SERPL-MCNC: 164 MG/DL
VLDLC SERPL CALC-MCNC: 32.8 MG/DL

## 2023-09-25 ENCOUNTER — HOSPITAL ENCOUNTER (OUTPATIENT)
Facility: HOSPITAL | Age: 68
Discharge: HOME | End: 2023-09-28
Attending: INTERNAL MEDICINE
Payer: MEDICARE

## 2023-09-25 DIAGNOSIS — R91.8 MULTIPLE PULMONARY NODULES DETERMINED BY COMPUTED TOMOGRAPHY OF LUNG: ICD-10-CM

## 2023-09-25 PROCEDURE — 71250 CT THORAX DX C-: CPT

## 2024-02-09 ENCOUNTER — OFFICE VISIT (OUTPATIENT)
Age: 69
End: 2024-02-09

## 2024-02-09 VITALS
DIASTOLIC BLOOD PRESSURE: 88 MMHG | TEMPERATURE: 97.8 F | BODY MASS INDEX: 33.4 KG/M2 | WEIGHT: 238.6 LBS | RESPIRATION RATE: 18 BRPM | OXYGEN SATURATION: 96 % | HEART RATE: 60 BPM | HEIGHT: 71 IN | SYSTOLIC BLOOD PRESSURE: 122 MMHG

## 2024-02-09 DIAGNOSIS — M70.22 OLECRANON BURSITIS OF LEFT ELBOW: Primary | ICD-10-CM

## 2024-02-09 RX ORDER — CEPHALEXIN 500 MG/1
1000 CAPSULE ORAL 2 TIMES DAILY
Qty: 40 CAPSULE | Refills: 0 | Status: SHIPPED | OUTPATIENT
Start: 2024-02-09 | End: 2024-02-19

## 2024-02-09 RX ORDER — LIDOCAINE HYDROCHLORIDE 10 MG/ML
5 INJECTION, SOLUTION INFILTRATION; PERINEURAL ONCE
Status: COMPLETED | OUTPATIENT
Start: 2024-02-09 | End: 2024-02-09

## 2024-02-09 RX ADMIN — LIDOCAINE HYDROCHLORIDE 5 ML: 10 INJECTION, SOLUTION INFILTRATION; PERINEURAL at 14:36

## 2024-02-09 ASSESSMENT — PATIENT HEALTH QUESTIONNAIRE - PHQ9
SUM OF ALL RESPONSES TO PHQ9 QUESTIONS 1 & 2: 0
SUM OF ALL RESPONSES TO PHQ QUESTIONS 1-9: 0
2. FEELING DOWN, DEPRESSED OR HOPELESS: 0
SUM OF ALL RESPONSES TO PHQ QUESTIONS 1-9: 0
SUM OF ALL RESPONSES TO PHQ QUESTIONS 1-9: 0
1. LITTLE INTEREST OR PLEASURE IN DOING THINGS: 0
SUM OF ALL RESPONSES TO PHQ QUESTIONS 1-9: 0

## 2024-02-09 NOTE — PROGRESS NOTES
Valerio Toro is a 68 y.o. male presenting for/with:    Chief Complaint   Patient presents with    Elbow Injury     Pt stated that he hit his left elbow on something a week ago and now his left elbow is swollen, red and painful to touch. Pt reports having issues extending his left arm.       Vitals:    02/09/24 1334   BP: 122/88   Site: Right Upper Arm   Position: Sitting   Cuff Size: Medium Adult   Pulse: 60   Resp: 18   Temp: 97.8 °F (36.6 °C)   TempSrc: Temporal   SpO2: 96%   Weight: 108.2 kg (238 lb 9.6 oz)   Height: 1.803 m (5' 11\")       Pain Scale: 0 - No pain/10  Pain Location:     \"Have you been to the ER, urgent care clinic since your last visit?  Hospitalized since your last visit?\"    NO    “Have you seen or consulted any other health care providers outside of [facility] since your last visit?”    NO                 2/9/2024     1:32 PM   PHQ-9    Little interest or pleasure in doing things 0   Feeling down, depressed, or hopeless 0   PHQ-2 Score 0   PHQ-9 Total Score 0           2/9/2024     1:30 PM 8/19/2022    12:00 AM   [facility] AMB LEARNING ASSESSMENT   Primary Learner Patient Patient   Primary Language ENGLISH ENGLISH   Learning Preference DEMONSTRATION READING   Answered By patient patient   Relationship to Learner SELF SELF            2/9/2024     1:33 PM   Amb Fall Risk Assessment and TUG Test   Do you feel unsteady or are you worried about falling?  no   2 or more falls in past year? no   Fall with injury in past year? no           2/9/2024     1:00 PM 8/11/2023     8:00 AM   ADL ASSESSMENT   Feeding yourself No Help Needed No Help Needed   Getting from bed to chair No Help Needed No Help Needed   Getting dressed No Help Needed No Help Needed   Bathing or showering No Help Needed No Help Needed   Walk across the room (includes cane/walker) No Help Needed No Help Needed   Using the telphone No Help Needed No Help Needed   Taking your medications No Help Needed No Help Needed 
following:   Roddy WALKER MD, have reviewed the History, Physical and updated the Allergic reactions for Valerio Toro     TIME OUT performed immediately prior to start of procedure:   Roddy WALKER MD, have performed the following reviews on Valerio Toro prior to the start of the procedure:            * Patient was identified by name and date of birth   * Agreement on procedure being performed was verified  * Risks and Benefits explained to the patient, Pt gave understanding  * Procedure site verified and marked as necessary  * Patient was positioned for comfort  * Consent was verified  * Pain level 0 pre-procedure.   2:19 PM  Procedure: Arthrocentesis elbow left Indication: olecranon bursitis. Consent: signed, on chart. Details: Clean techinque. Alcohol prep. posterior approach. 1ml of 1% plain lidocaine infiltrated into the pone of elbow bursa. A 1.5\" 18ga needle easily placed into the bursa and 5ml clear fluid aspirated  Pt rusty well. Complications: none. Disposition: A+O, ambulatory without difficulty. Pain level in goal control.

## 2024-02-16 ENCOUNTER — OFFICE VISIT (OUTPATIENT)
Age: 69
End: 2024-02-16
Payer: MEDICARE

## 2024-02-16 VITALS
WEIGHT: 238 LBS | SYSTOLIC BLOOD PRESSURE: 132 MMHG | DIASTOLIC BLOOD PRESSURE: 74 MMHG | HEART RATE: 89 BPM | BODY MASS INDEX: 33.32 KG/M2 | RESPIRATION RATE: 18 BRPM | OXYGEN SATURATION: 96 % | TEMPERATURE: 97.5 F | HEIGHT: 71 IN

## 2024-02-16 DIAGNOSIS — M70.22 OLECRANON BURSITIS OF LEFT ELBOW: Primary | ICD-10-CM

## 2024-02-16 DIAGNOSIS — E78.2 MIXED HYPERLIPIDEMIA: ICD-10-CM

## 2024-02-16 LAB
CHOLEST SERPL-MCNC: 168 MG/DL
HDLC SERPL-MCNC: 33 MG/DL
HDLC SERPL: 5.1 (ref 0–5)
LDLC SERPL CALC-MCNC: 109.8 MG/DL (ref 0–100)
TRIGL SERPL-MCNC: 126 MG/DL
VLDLC SERPL CALC-MCNC: 25.2 MG/DL

## 2024-02-16 PROCEDURE — 3017F COLORECTAL CA SCREEN DOC REV: CPT | Performed by: FAMILY MEDICINE

## 2024-02-16 PROCEDURE — 3078F DIAST BP <80 MM HG: CPT | Performed by: FAMILY MEDICINE

## 2024-02-16 PROCEDURE — G8417 CALC BMI ABV UP PARAM F/U: HCPCS | Performed by: FAMILY MEDICINE

## 2024-02-16 PROCEDURE — G8484 FLU IMMUNIZE NO ADMIN: HCPCS | Performed by: FAMILY MEDICINE

## 2024-02-16 PROCEDURE — 99214 OFFICE O/P EST MOD 30 MIN: CPT | Performed by: FAMILY MEDICINE

## 2024-02-16 PROCEDURE — 1036F TOBACCO NON-USER: CPT | Performed by: FAMILY MEDICINE

## 2024-02-16 PROCEDURE — 3075F SYST BP GE 130 - 139MM HG: CPT | Performed by: FAMILY MEDICINE

## 2024-02-16 PROCEDURE — G8427 DOCREV CUR MEDS BY ELIG CLIN: HCPCS | Performed by: FAMILY MEDICINE

## 2024-02-16 PROCEDURE — 1123F ACP DISCUSS/DSCN MKR DOCD: CPT | Performed by: FAMILY MEDICINE

## 2024-02-16 NOTE — PROGRESS NOTES
Valerio Toro is a 68 y.o. male presenting for/with:    Chief Complaint   Patient presents with    Bursitis     Left elbow 1 week check        Vitals:    02/16/24 1100   BP: 132/74   Pulse: 89   Resp: 18   Temp: 97.5 °F (36.4 °C)   TempSrc: Temporal   SpO2: 96%   Weight: 108 kg (238 lb)   Height: 1.803 m (5' 11\")       Pain Scale: 2/10  Pain Location: Elbow    \"Have you been to the ER, urgent care clinic since your last visit?  Hospitalized since your last visit?\"    NO    “Have you seen or consulted any other health care providers outside of [facility] since your last visit?”    NO                 2/16/2024    11:07 AM   PHQ-9    Little interest or pleasure in doing things 0   Feeling down, depressed, or hopeless 0   PHQ-2 Score 0   PHQ-9 Total Score 0           2/9/2024     1:30 PM 8/19/2022    12:00 AM   [facility] AMB LEARNING ASSESSMENT   Primary Learner Patient Patient   Primary Language ENGLISH ENGLISH   Learning Preference DEMONSTRATION READING   Answered By patient patient   Relationship to Learner SELF SELF            2/16/2024    11:07 AM   Amb Fall Risk Assessment and TUG Test   Do you feel unsteady or are you worried about falling?  no   2 or more falls in past year? no   Fall with injury in past year? no           2/16/2024    11:00 AM 2/9/2024     1:00 PM 8/11/2023     8:00 AM   ADL ASSESSMENT   Feeding yourself No Help Needed No Help Needed No Help Needed   Getting from bed to chair No Help Needed No Help Needed No Help Needed   Getting dressed No Help Needed No Help Needed No Help Needed   Bathing or showering No Help Needed No Help Needed No Help Needed   Walk across the room (includes cane/walker) No Help Needed No Help Needed No Help Needed   Using the telphone No Help Needed No Help Needed No Help Needed   Taking your medications No Help Needed No Help Needed No Help Needed   Preparing meals No Help Needed No Help Needed No Help Needed   Managing money (expenses/bills) No Help Needed

## 2024-02-16 NOTE — PATIENT INSTRUCTIONS
RSV vaccine and new COVID omicron vaccines due at your pharmacy. Please get when available, can help prevent severe lung disease.     Also consider getting Shingrix x2 at your pharmacy (free now, protects against shingles)

## 2024-02-16 NOTE — PROGRESS NOTES
Valerio Toro is a 68 y.o. male  Chief Complaint   Patient presents with    Bursitis     Left elbow 1 week check      HPI:  Follow up L elbow swelling, redness, and warmth, with some superficial spreading erythema over past week. Started after striking elbow on oven door.      Reviewed PMH, PSH, SH, Medications, allergies (see chart).  Current Outpatient Medications   Medication Sig    cephALEXin (KEFLEX) 500 MG capsule Take 2 capsules by mouth 2 times daily for 10 days Indications: infected elbow    losartan (COZAAR) 50 MG tablet Take 1 tablet by mouth daily     No current facility-administered medications for this visit.     ROS:   General: No fever, chills, or abnormal weight loss  Respiratory: No cough, dyspnea  CV: No chest pain, palpitations    Objective:  Visit Vitals  /74   Pulse 89   Temp 97.5 °F (36.4 °C) (Temporal)   Resp 18   Ht 1.803 m (5' 11\")   Wt 108 kg (238 lb)   SpO2 96%   BMI 33.19 kg/m²     Wt Readings from Last 3 Encounters:   02/16/24 108 kg (238 lb)   02/09/24 108.2 kg (238 lb 9.6 oz)   08/11/23 109.5 kg (241 lb 6.4 oz)     BP Readings from Last 3 Encounters:   02/16/24 132/74   02/09/24 122/88   08/11/23 (!) 139/90     Focused exam:  L elbow NTTP with resolved erythema, minimal edema to the olecranon bursa, resolved superficial spreading erythema.    Lab Results   Component Value Date/Time     08/11/2023 10:24 AM    K 4.2 08/11/2023 10:24 AM     08/11/2023 10:24 AM    CO2 24 08/11/2023 10:24 AM    BUN 13 08/11/2023 10:24 AM    CREATININE 0.92 08/11/2023 10:24 AM    GLUCOSE 95 08/11/2023 10:24 AM    CALCIUM 9.2 08/11/2023 10:24 AM    LABGLOM >60 08/11/2023 10:24 AM    LABGLOM 81 08/19/2022 12:00 AM      A/P  Olecranon bursitis  Doing a lot better s/p aspiration and course keflex 1000mg BID. Monitor.  Recheck PRN.    HLD  Up last check. Recheck today, consider start statin if pt ready.    HCM  Work on vaccines, pt will try.    F/U 6mo/ PRN

## 2024-07-31 DIAGNOSIS — I10 ESSENTIAL (PRIMARY) HYPERTENSION: ICD-10-CM

## 2024-07-31 RX ORDER — LOSARTAN POTASSIUM 50 MG/1
50 TABLET ORAL DAILY
Qty: 90 TABLET | Refills: 3 | Status: SHIPPED | OUTPATIENT
Start: 2024-07-31

## 2024-10-01 ENCOUNTER — HOSPITAL ENCOUNTER (OUTPATIENT)
Facility: HOSPITAL | Age: 69
Discharge: HOME | End: 2024-10-04
Attending: INTERNAL MEDICINE
Payer: MEDICARE

## 2024-10-01 DIAGNOSIS — R91.8 MULTIPLE PULMONARY NODULES DETERMINED BY COMPUTED TOMOGRAPHY OF LUNG: ICD-10-CM

## 2024-10-01 PROCEDURE — 71250 CT THORAX DX C-: CPT

## 2025-03-25 ENCOUNTER — OFFICE VISIT (OUTPATIENT)
Age: 70
End: 2025-03-25
Payer: MEDICARE

## 2025-03-25 VITALS
HEART RATE: 78 BPM | HEIGHT: 71 IN | RESPIRATION RATE: 18 BRPM | TEMPERATURE: 98.4 F | BODY MASS INDEX: 33.49 KG/M2 | OXYGEN SATURATION: 99 % | DIASTOLIC BLOOD PRESSURE: 99 MMHG | SYSTOLIC BLOOD PRESSURE: 142 MMHG | WEIGHT: 239.2 LBS

## 2025-03-25 DIAGNOSIS — I25.10 CORONARY ARTERY CALCIFICATION SEEN ON COMPUTED TOMOGRAPHY: ICD-10-CM

## 2025-03-25 DIAGNOSIS — I10 PRIMARY HYPERTENSION: ICD-10-CM

## 2025-03-25 DIAGNOSIS — Z00.00 MEDICARE ANNUAL WELLNESS VISIT, SUBSEQUENT: Primary | ICD-10-CM

## 2025-03-25 DIAGNOSIS — R91.8 MULTIPLE LUNG NODULES ON CT: ICD-10-CM

## 2025-03-25 DIAGNOSIS — E78.2 MIXED HYPERLIPIDEMIA: ICD-10-CM

## 2025-03-25 PROCEDURE — 1126F AMNT PAIN NOTED NONE PRSNT: CPT | Performed by: FAMILY MEDICINE

## 2025-03-25 PROCEDURE — 3080F DIAST BP >= 90 MM HG: CPT | Performed by: FAMILY MEDICINE

## 2025-03-25 PROCEDURE — G8419 CALC BMI OUT NRM PARAM NOF/U: HCPCS | Performed by: FAMILY MEDICINE

## 2025-03-25 PROCEDURE — 1036F TOBACCO NON-USER: CPT | Performed by: FAMILY MEDICINE

## 2025-03-25 PROCEDURE — 3077F SYST BP >= 140 MM HG: CPT | Performed by: FAMILY MEDICINE

## 2025-03-25 PROCEDURE — G8427 DOCREV CUR MEDS BY ELIG CLIN: HCPCS | Performed by: FAMILY MEDICINE

## 2025-03-25 PROCEDURE — 3017F COLORECTAL CA SCREEN DOC REV: CPT | Performed by: FAMILY MEDICINE

## 2025-03-25 PROCEDURE — 99214 OFFICE O/P EST MOD 30 MIN: CPT | Performed by: FAMILY MEDICINE

## 2025-03-25 PROCEDURE — G0439 PPPS, SUBSEQ VISIT: HCPCS | Performed by: FAMILY MEDICINE

## 2025-03-25 PROCEDURE — 1123F ACP DISCUSS/DSCN MKR DOCD: CPT | Performed by: FAMILY MEDICINE

## 2025-03-25 PROCEDURE — 1159F MED LIST DOCD IN RCRD: CPT | Performed by: FAMILY MEDICINE

## 2025-03-25 RX ORDER — ASPIRIN 81 MG/1
81 TABLET ORAL DAILY
Qty: 100 TABLET | Refills: 3 | Status: SHIPPED | OUTPATIENT
Start: 2025-03-25

## 2025-03-25 RX ORDER — LOSARTAN POTASSIUM 100 MG/1
100 TABLET ORAL DAILY
Qty: 90 TABLET | Refills: 3 | Status: SHIPPED | OUTPATIENT
Start: 2025-03-25

## 2025-03-25 RX ORDER — ROSUVASTATIN CALCIUM 10 MG/1
10 TABLET, COATED ORAL DAILY
Qty: 30 TABLET | Refills: 11 | Status: SHIPPED | OUTPATIENT
Start: 2025-03-25

## 2025-03-25 SDOH — ECONOMIC STABILITY: FOOD INSECURITY: WITHIN THE PAST 12 MONTHS, YOU WORRIED THAT YOUR FOOD WOULD RUN OUT BEFORE YOU GOT MONEY TO BUY MORE.: NEVER TRUE

## 2025-03-25 SDOH — ECONOMIC STABILITY: FOOD INSECURITY: WITHIN THE PAST 12 MONTHS, THE FOOD YOU BOUGHT JUST DIDN'T LAST AND YOU DIDN'T HAVE MONEY TO GET MORE.: NEVER TRUE

## 2025-03-25 ASSESSMENT — PATIENT HEALTH QUESTIONNAIRE - PHQ9
SUM OF ALL RESPONSES TO PHQ QUESTIONS 1-9: 0
1. LITTLE INTEREST OR PLEASURE IN DOING THINGS: NOT AT ALL
SUM OF ALL RESPONSES TO PHQ QUESTIONS 1-9: 0
2. FEELING DOWN, DEPRESSED OR HOPELESS: NOT AT ALL

## 2025-03-25 ASSESSMENT — LIFESTYLE VARIABLES
HOW OFTEN DO YOU HAVE A DRINK CONTAINING ALCOHOL: 2-3 TIMES A WEEK
HOW MANY STANDARD DRINKS CONTAINING ALCOHOL DO YOU HAVE ON A TYPICAL DAY: 1 OR 2

## 2025-03-25 NOTE — PROGRESS NOTES
Valerio Toro is a 69 y.o. male  Chief Complaint   Patient presents with    Annual Exam     Patient states that he gets congested only at night.      HPI:  DJD  Symptoms include no sx. His shoulder and knee have done well with surgical mgmt and phys therapy with [place]. No longer needs any meds or extra tx. Has occ discomfort to LIF at the PIP, no triggering, erythema or superficial TTP.    Hypertension.   Blood pressures are worsening. Management at last visit included adding ARB losartan 25mg/d. Dara well. Symptoms include no symptoms. Patient denies chest pain, palpitations, peripheral edema.  Lab review:   Lab Results   Component Value Date     08/11/2023    K 4.2 08/11/2023     08/11/2023    CO2 24 08/11/2023    GLUCOSE 95 08/11/2023    BUN 13 08/11/2023    CREATININE 0.92 08/11/2023     Hyperlipidemia and new dx of coronary artery calcifications on CT  On TLD's alone. Due to discuss medical mgmt.   Lab Results   Component Value Date    CHOL 168 02/16/2024    HDL 33 02/16/2024    .8 (H) 02/16/2024    TRIG 126 02/16/2024    AST 23 08/11/2023    ALT 50 08/11/2023    ALKPHOS 69 08/11/2023    BILITOT 0.5 08/11/2023     Lung nodules on CT  Prev seeing DR. Steel. Done with that now. Last CT 10/2024 was reassuring with no suspicious nodules. Done with checks for now.    Reviewed PMH, PSH, SH, Medications, allergies (see chart).  Current Outpatient Medications   Medication Sig    losartan (COZAAR) 50 MG tablet Take 1 tablet by mouth once daily     No current facility-administered medications for this visit.     ROS:   General: No fever, chills, or abnormal weight loss  Respiratory: No cough, dyspnea  CV: No chest pain, palpitations    Objective:  Visit Vitals  BP (!) 142/99 (BP Site: Left Upper Arm, Patient Position: Sitting, BP Cuff Size: Large Adult)   Pulse 78   Temp 98.4 °F (36.9 °C) (Temporal)   Resp 18   Ht 1.803 m (5' 11\")   Wt 108.5 kg (239 lb 3.2 oz)   SpO2 99%   BMI 33.36 kg/m²     Wt

## 2025-03-25 NOTE — PATIENT INSTRUCTIONS
RSV vaccine and new Shingrix vaccines due at your pharmacy. Please get when available, can help prevent severe disease.     We found you have a new diagnosis of mild coronary heart disease (arterial calcifications). We recommend 81mg aspirin daily, good blood pressure control, and a mild cholesterol medication (we started rosuvastatin today).    A Healthy Heart: Care Instructions  Overview     Coronary artery disease, also called heart disease, occurs when a substance called plaque builds up in the vessels that supply oxygen-rich blood to your heart muscle. This can narrow the blood vessels and reduce blood flow. A heart attack happens when blood flow is completely blocked. A high-fat diet, smoking, and other factors increase the risk of heart disease.  Your doctor has found that you have a chance of having heart disease. A heart-healthy lifestyle can help keep your heart healthy and prevent heart disease. This lifestyle includes eating healthy, being active, staying at a weight that's healthy for you, and not smoking or using tobacco. It also includes taking medicines as directed, managing other health conditions, and trying to get a healthy amount of sleep.  Follow-up care is a key part of your treatment and safety. Be sure to make and go to all appointments, and call your doctor if you are having problems. It's also a good idea to know your test results and keep a list of the medicines you take.  How can you care for yourself at home?  Diet    Use less salt when you cook and eat. This helps lower your blood pressure. Taste food before salting. Add only a little salt when you think you need it. With time, your taste buds will adjust to less salt.     Eat fewer snack items, fast foods, canned soups, and other high-salt, high-fat, processed foods.     Read food labels and try to avoid saturated and trans fats. They increase your risk of heart disease by raising cholesterol levels.     Limit the amount of solid

## 2025-03-25 NOTE — PROGRESS NOTES
Valerio Toro is a 69 y.o. male presenting for/with:    Chief Complaint   Patient presents with    Annual Exam     Patient states that he gets congested only at night.        Vitals:    03/25/25 0800   BP: (!) 142/99   BP Site: Left Upper Arm   Patient Position: Sitting   BP Cuff Size: Large Adult   Pulse: 78   Resp: 18   Temp: 98.4 °F (36.9 °C)   TempSrc: Temporal   SpO2: 99%   Weight: 108.5 kg (239 lb 3.2 oz)   Height: 1.803 m (5' 11\")       Pain Scale: 0 - No pain/10  Pain Location:     \"Have you been to the ER, urgent care clinic since your last visit?  Hospitalized since your last visit?\"    NO    “Have you seen or consulted any other health care providers outside of [facility] since your last visit?”    NO                 3/25/2025     8:05 AM   PHQ-9    Little interest or pleasure in doing things 0   Feeling down, depressed, or hopeless 0   PHQ-2 Score 0   PHQ-9 Total Score 0           2/9/2024     1:30 PM 8/19/2022    12:00 AM   [facility] AMB LEARNING ASSESSMENT   Primary Learner Patient Patient   Primary Language ENGLISH ENGLISH   Learning Preference DEMONSTRATION READING   Answered By patient patient   Relationship to Learner SELF SELF            3/25/2025     8:03 AM   Amb Fall Risk Assessment and TUG Test   Do you feel unsteady or are you worried about falling?  no   2 or more falls in past year? no   Fall with injury in past year? no           3/25/2025     8:00 AM 2/16/2024    11:00 AM 2/9/2024     1:00 PM 8/11/2023     8:00 AM   ADL ASSESSMENT   Feeding yourself No Help Needed No Help Needed No Help Needed No Help Needed   Getting from bed to chair No Help Needed No Help Needed No Help Needed No Help Needed   Getting dressed No Help Needed No Help Needed No Help Needed No Help Needed   Bathing or showering No Help Needed No Help Needed No Help Needed No Help Needed   Walk across the room (includes cane/walker) No Help Needed No Help Needed No Help Needed No Help Needed   Using the

## 2025-03-26 LAB
ALBUMIN SERPL-MCNC: 3.7 G/DL (ref 3.5–5)
ALBUMIN/GLOB SERPL: 1 (ref 1.1–2.2)
ALP SERPL-CCNC: 70 U/L (ref 45–117)
ALT SERPL-CCNC: 42 U/L (ref 12–78)
ANION GAP SERPL CALC-SCNC: 4 MMOL/L (ref 2–12)
AST SERPL-CCNC: 19 U/L (ref 15–37)
BILIRUB SERPL-MCNC: 0.4 MG/DL (ref 0.2–1)
BUN SERPL-MCNC: 9 MG/DL (ref 6–20)
BUN/CREAT SERPL: 9 (ref 12–20)
CALCIUM SERPL-MCNC: 9.2 MG/DL (ref 8.5–10.1)
CHLORIDE SERPL-SCNC: 106 MMOL/L (ref 97–108)
CHOLEST SERPL-MCNC: 212 MG/DL
CO2 SERPL-SCNC: 29 MMOL/L (ref 21–32)
CREAT SERPL-MCNC: 1.02 MG/DL (ref 0.7–1.3)
GLOBULIN SER CALC-MCNC: 3.8 G/DL (ref 2–4)
GLUCOSE SERPL-MCNC: 97 MG/DL (ref 65–100)
HDLC SERPL-MCNC: 37 MG/DL
HDLC SERPL: 5.7 (ref 0–5)
LDLC SERPL CALC-MCNC: 139.8 MG/DL (ref 0–100)
POTASSIUM SERPL-SCNC: 4.1 MMOL/L (ref 3.5–5.1)
PROT SERPL-MCNC: 7.5 G/DL (ref 6.4–8.2)
SODIUM SERPL-SCNC: 139 MMOL/L (ref 136–145)
TRIGL SERPL-MCNC: 176 MG/DL
VLDLC SERPL CALC-MCNC: 35.2 MG/DL

## 2025-04-09 ENCOUNTER — RESULTS FOLLOW-UP (OUTPATIENT)
Age: 70
End: 2025-04-09

## 2025-04-09 NOTE — RESULT ENCOUNTER NOTE
Labs ok for now, except for rising cholesterol, much higher than last check. Should improve nicely with rosuvastatin.

## 2025-04-24 ENCOUNTER — HOSPITAL ENCOUNTER (OUTPATIENT)
Facility: HOSPITAL | Age: 70
Discharge: HOME | End: 2025-04-27
Payer: MEDICARE

## 2025-04-24 ENCOUNTER — TRANSCRIBE ORDERS (OUTPATIENT)
Facility: HOSPITAL | Age: 70
End: 2025-04-24

## 2025-04-24 DIAGNOSIS — M25.511 RIGHT SHOULDER PAIN, UNSPECIFIED CHRONICITY: Primary | ICD-10-CM

## 2025-04-24 DIAGNOSIS — M25.511 RIGHT SHOULDER PAIN, UNSPECIFIED CHRONICITY: ICD-10-CM

## 2025-04-24 PROCEDURE — 73030 X-RAY EXAM OF SHOULDER: CPT

## 2025-05-29 ENCOUNTER — OFFICE VISIT (OUTPATIENT)
Age: 70
End: 2025-05-29
Payer: MEDICARE

## 2025-05-29 VITALS — OXYGEN SATURATION: 98 % | HEART RATE: 89 BPM | TEMPERATURE: 98.7 F

## 2025-05-29 DIAGNOSIS — J01.90 ACUTE BACTERIAL SINUSITIS: ICD-10-CM

## 2025-05-29 DIAGNOSIS — J06.9 ACUTE UPPER RESPIRATORY INFECTION, UNSPECIFIED: Primary | ICD-10-CM

## 2025-05-29 DIAGNOSIS — B96.89 ACUTE BACTERIAL SINUSITIS: ICD-10-CM

## 2025-05-29 PROCEDURE — 1123F ACP DISCUSS/DSCN MKR DOCD: CPT | Performed by: NURSE PRACTITIONER

## 2025-05-29 PROCEDURE — 1036F TOBACCO NON-USER: CPT | Performed by: NURSE PRACTITIONER

## 2025-05-29 PROCEDURE — 1160F RVW MEDS BY RX/DR IN RCRD: CPT | Performed by: NURSE PRACTITIONER

## 2025-05-29 PROCEDURE — 1126F AMNT PAIN NOTED NONE PRSNT: CPT | Performed by: NURSE PRACTITIONER

## 2025-05-29 PROCEDURE — 1159F MED LIST DOCD IN RCRD: CPT | Performed by: NURSE PRACTITIONER

## 2025-05-29 PROCEDURE — 99213 OFFICE O/P EST LOW 20 MIN: CPT | Performed by: NURSE PRACTITIONER

## 2025-05-29 PROCEDURE — G8417 CALC BMI ABV UP PARAM F/U: HCPCS | Performed by: NURSE PRACTITIONER

## 2025-05-29 PROCEDURE — G8427 DOCREV CUR MEDS BY ELIG CLIN: HCPCS | Performed by: NURSE PRACTITIONER

## 2025-05-29 PROCEDURE — 3017F COLORECTAL CA SCREEN DOC REV: CPT | Performed by: NURSE PRACTITIONER

## 2025-05-29 RX ORDER — AZITHROMYCIN 250 MG/1
TABLET, FILM COATED ORAL
Qty: 6 TABLET | Refills: 0 | Status: SHIPPED | OUTPATIENT
Start: 2025-05-29 | End: 2025-06-08

## 2025-05-29 ASSESSMENT — PATIENT HEALTH QUESTIONNAIRE - PHQ9
1. LITTLE INTEREST OR PLEASURE IN DOING THINGS: NOT AT ALL
SUM OF ALL RESPONSES TO PHQ QUESTIONS 1-9: 0
2. FEELING DOWN, DEPRESSED OR HOPELESS: NOT AT ALL
SUM OF ALL RESPONSES TO PHQ QUESTIONS 1-9: 0

## 2025-05-29 NOTE — PROGRESS NOTES
Have you been to the ER, urgent care clinic since your last visit?  Hospitalized since your last visit?   NO    Have you seen or consulted any other health care providers outside our system since your last visit?   NO           
Dispense: 6 tablet; Refill: 0     RTC PRN    Margaret Escalona NP

## 2025-06-23 ENCOUNTER — OFFICE VISIT (OUTPATIENT)
Age: 70
End: 2025-06-23
Payer: MEDICARE

## 2025-06-23 VITALS
BODY MASS INDEX: 33.1 KG/M2 | WEIGHT: 236.4 LBS | RESPIRATION RATE: 18 BRPM | HEART RATE: 80 BPM | HEIGHT: 71 IN | OXYGEN SATURATION: 97 % | SYSTOLIC BLOOD PRESSURE: 146 MMHG | DIASTOLIC BLOOD PRESSURE: 91 MMHG | TEMPERATURE: 97.1 F

## 2025-06-23 DIAGNOSIS — I25.10 CORONARY ARTERY CALCIFICATION SEEN ON COMPUTED TOMOGRAPHY: ICD-10-CM

## 2025-06-23 DIAGNOSIS — I10 PRIMARY HYPERTENSION: ICD-10-CM

## 2025-06-23 DIAGNOSIS — R29.818 OTHER SYMPTOMS AND SIGNS INVOLVING THE NERVOUS SYSTEM: ICD-10-CM

## 2025-06-23 DIAGNOSIS — G31.84 MILD COGNITIVE IMPAIRMENT: Primary | ICD-10-CM

## 2025-06-23 DIAGNOSIS — E78.2 MIXED HYPERLIPIDEMIA: ICD-10-CM

## 2025-06-23 PROCEDURE — G8417 CALC BMI ABV UP PARAM F/U: HCPCS | Performed by: FAMILY MEDICINE

## 2025-06-23 PROCEDURE — 1036F TOBACCO NON-USER: CPT | Performed by: FAMILY MEDICINE

## 2025-06-23 PROCEDURE — 99214 OFFICE O/P EST MOD 30 MIN: CPT | Performed by: FAMILY MEDICINE

## 2025-06-23 PROCEDURE — 3017F COLORECTAL CA SCREEN DOC REV: CPT | Performed by: FAMILY MEDICINE

## 2025-06-23 PROCEDURE — 1159F MED LIST DOCD IN RCRD: CPT | Performed by: FAMILY MEDICINE

## 2025-06-23 PROCEDURE — 3080F DIAST BP >= 90 MM HG: CPT | Performed by: FAMILY MEDICINE

## 2025-06-23 PROCEDURE — 1126F AMNT PAIN NOTED NONE PRSNT: CPT | Performed by: FAMILY MEDICINE

## 2025-06-23 PROCEDURE — 1160F RVW MEDS BY RX/DR IN RCRD: CPT | Performed by: FAMILY MEDICINE

## 2025-06-23 PROCEDURE — 1123F ACP DISCUSS/DSCN MKR DOCD: CPT | Performed by: FAMILY MEDICINE

## 2025-06-23 PROCEDURE — G8427 DOCREV CUR MEDS BY ELIG CLIN: HCPCS | Performed by: FAMILY MEDICINE

## 2025-06-23 PROCEDURE — 3077F SYST BP >= 140 MM HG: CPT | Performed by: FAMILY MEDICINE

## 2025-06-23 RX ORDER — ATORVASTATIN CALCIUM 10 MG/1
10 TABLET, FILM COATED ORAL DAILY
Qty: 30 TABLET | Refills: 3 | Status: SHIPPED | OUTPATIENT
Start: 2025-06-23

## 2025-06-23 NOTE — PROGRESS NOTES
Valerio Toro is a 70 y.o. male presenting for/with:    Chief Complaint   Patient presents with    Memory Loss       Vitals:    06/23/25 1400   BP: (!) 146/91   Pulse: 80   Resp: 18   Temp: 97.1 °F (36.2 °C)   TempSrc: Temporal   SpO2: 97%   Weight: 107.2 kg (236 lb 6.4 oz)   Height: 1.803 m (5' 11\")       Pain Scale: 0 - No pain/10  Pain Location:     \"Have you been to the ER, urgent care clinic since your last visit?  Hospitalized since your last visit?\"    NO    “Have you seen or consulted any other health care providers outside of [facility] since your last visit?”    NO                 6/23/2025     2:53 PM   PHQ-9    Little interest or pleasure in doing things 0   Feeling down, depressed, or hopeless 0   PHQ-2 Score 0   PHQ-9 Total Score 0           2/9/2024     1:30 PM 8/19/2022    12:00 AM   [facility] AMB LEARNING ASSESSMENT   Primary Learner Patient Patient   Primary Language ENGLISH ENGLISH   Learning Preference DEMONSTRATION READING   Answered By patient patient   Relationship to Learner SELF SELF            6/23/2025     2:53 PM   Amb Fall Risk Assessment and TUG Test   Do you feel unsteady or are you worried about falling?  yes   2 or more falls in past year? no   Fall with injury in past year? no           6/23/2025     2:00 PM 3/25/2025     8:00 AM 2/16/2024    11:00 AM 2/9/2024     1:00 PM 8/11/2023     8:00 AM   ADL ASSESSMENT   Feeding yourself No Help Needed No Help Needed No Help Needed No Help Needed No Help Needed   Getting from bed to chair No Help Needed No Help Needed No Help Needed No Help Needed No Help Needed   Getting dressed No Help Needed No Help Needed No Help Needed No Help Needed No Help Needed   Bathing or showering No Help Needed No Help Needed No Help Needed No Help Needed No Help Needed   Walk across the room (includes cane/walker) No Help Needed No Help Needed No Help Needed No Help Needed No Help Needed   Using the telphone No Help Needed No Help Needed No Help

## 2025-06-23 NOTE — PROGRESS NOTES
Valerio Toro is a 70 y.o. male  Chief Complaint   Patient presents with    Memory Loss     HPI:  Memory loss  Has had gradually worsening memory over the past year.     DJD  Symptoms include no sx. His shoulder and knee have done well with surgical mgmt and phys therapy with [place]. No longer needs any meds or extra tx. Has occ discomfort to LIF at the PIP, no triggering, erythema or superficial TTP.    Hypertension.   Blood pressures are worsening. Management at last visit included adding ARB losartan 25mg/d. Dara well. Symptoms include no symptoms. Patient denies chest pain, palpitations, peripheral edema.  Lab review:   Lab Results   Component Value Date     03/25/2025    K 4.1 03/25/2025     03/25/2025    CO2 29 03/25/2025    GLUCOSE 97 03/25/2025    BUN 9 03/25/2025    CREATININE 1.02 03/25/2025     Hyperlipidemia and new dx of coronary artery calcifications on CT  On TLD's alone. Due to discuss medical mgmt.   Lab Results   Component Value Date    CHOL 212 (H) 03/25/2025    HDL 37 03/25/2025    .8 (H) 03/25/2025    TRIG 176 (H) 03/25/2025    AST 19 03/25/2025    ALT 42 03/25/2025    ALKPHOS 70 03/25/2025    BILITOT 0.4 03/25/2025     Lung nodules on CT  Prev seeing DR. Steel. Done with that now. Last CT 10/2024 was reassuring with no suspicious nodules. Done with checks for now.    Reviewed PMH, PSH, SH, Medications, allergies (see chart).  Current Outpatient Medications   Medication Sig    losartan (COZAAR) 100 MG tablet Take 1 tablet by mouth daily    rosuvastatin (CRESTOR) 10 MG tablet Take 1 tablet by mouth daily    aspirin 81 MG EC tablet Take 1 tablet by mouth daily Indications: protect heart     No current facility-administered medications for this visit.     ROS:   General: No fever, chills, or abnormal weight loss  Respiratory: No cough, dyspnea  CV: No chest pain, palpitations    Objective:  Visit Vitals  BP (!) 146/91   Pulse 80   Temp 97.1 °F (36.2 °C) (Temporal)   Resp 18

## 2025-07-09 ENCOUNTER — LAB (OUTPATIENT)
Age: 70
End: 2025-07-09
Payer: MEDICARE

## 2025-07-09 ENCOUNTER — HOSPITAL ENCOUNTER (OUTPATIENT)
Facility: HOSPITAL | Age: 70
Discharge: HOME | End: 2025-07-12
Attending: FAMILY MEDICINE
Payer: MEDICARE

## 2025-07-09 DIAGNOSIS — G31.84 MILD COGNITIVE IMPAIRMENT: ICD-10-CM

## 2025-07-09 DIAGNOSIS — I25.10 CORONARY ARTERY CALCIFICATION SEEN ON COMPUTED TOMOGRAPHY: ICD-10-CM

## 2025-07-09 DIAGNOSIS — R29.818 OTHER SYMPTOMS AND SIGNS INVOLVING THE NERVOUS SYSTEM: ICD-10-CM

## 2025-07-09 PROCEDURE — 70450 CT HEAD/BRAIN W/O DYE: CPT

## 2025-07-10 LAB
ALBUMIN SERPL-MCNC: 3.8 G/DL (ref 3.5–5)
ALBUMIN/GLOB SERPL: 1 (ref 1.1–2.2)
ALP SERPL-CCNC: 68 U/L (ref 45–117)
ALT SERPL-CCNC: 34 U/L (ref 12–78)
ANION GAP SERPL CALC-SCNC: 7 MMOL/L (ref 2–12)
AST SERPL-CCNC: 17 U/L (ref 15–37)
BILIRUB SERPL-MCNC: 0.5 MG/DL (ref 0.2–1)
BUN SERPL-MCNC: 11 MG/DL (ref 6–20)
BUN/CREAT SERPL: 11 (ref 12–20)
CALCIUM SERPL-MCNC: 9.2 MG/DL (ref 8.5–10.1)
CHLORIDE SERPL-SCNC: 106 MMOL/L (ref 97–108)
CHOLEST SERPL-MCNC: 135 MG/DL
CO2 SERPL-SCNC: 25 MMOL/L (ref 21–32)
CREAT SERPL-MCNC: 0.97 MG/DL (ref 0.7–1.3)
GLOBULIN SER CALC-MCNC: 3.7 G/DL (ref 2–4)
GLUCOSE SERPL-MCNC: 152 MG/DL (ref 65–100)
HDLC SERPL-MCNC: 37 MG/DL
HDLC SERPL: 3.6 (ref 0–5)
LDLC SERPL CALC-MCNC: 73.2 MG/DL (ref 0–100)
POTASSIUM SERPL-SCNC: 3.7 MMOL/L (ref 3.5–5.1)
PROT SERPL-MCNC: 7.5 G/DL (ref 6.4–8.2)
SODIUM SERPL-SCNC: 138 MMOL/L (ref 136–145)
TRIGL SERPL-MCNC: 124 MG/DL
VIT B12 SERPL-MCNC: 644 PG/ML (ref 193–986)
VLDLC SERPL CALC-MCNC: 24.8 MG/DL

## 2025-07-11 ENCOUNTER — PATIENT MESSAGE (OUTPATIENT)
Age: 70
End: 2025-07-11

## 2025-07-11 DIAGNOSIS — E78.2 MIXED HYPERLIPIDEMIA: ICD-10-CM

## 2025-07-11 DIAGNOSIS — I25.10 CORONARY ARTERY CALCIFICATION SEEN ON COMPUTED TOMOGRAPHY: ICD-10-CM

## 2025-07-11 DIAGNOSIS — R09.81 SINUS CONGESTION: Primary | ICD-10-CM

## 2025-07-11 LAB — TSH SERPL DL<=0.05 MIU/L-ACNC: 2.54 UIU/ML (ref 0.45–4.5)

## 2025-07-11 RX ORDER — ASPIRIN 81 MG/1
81 TABLET ORAL DAILY
Qty: 100 TABLET | Refills: 3 | Status: SHIPPED | OUTPATIENT
Start: 2025-07-11

## 2025-07-11 RX ORDER — PREDNISONE 20 MG/1
TABLET ORAL
Qty: 11 TABLET | Refills: 0 | Status: SHIPPED | OUTPATIENT
Start: 2025-07-11 | End: 2025-07-20

## 2025-07-11 RX ORDER — ATORVASTATIN CALCIUM 10 MG/1
10 TABLET, FILM COATED ORAL DAILY
Qty: 90 TABLET | Refills: 3 | Status: SHIPPED | OUTPATIENT
Start: 2025-07-11

## 2025-07-31 ENCOUNTER — TELEPHONE (OUTPATIENT)
Age: 70
End: 2025-07-31

## (undated) DEVICE — 2108 SERIES SAGITTAL BLADE (24.8 X 0.88 X 73.8MM)

## (undated) DEVICE — KENDALL DL ECG CABLE AND LEAD WIRE SYSTEM, 3-LEAD, SINGLE PATIENT USE: Brand: KENDALL

## (undated) DEVICE — SOLUTION IRRIG 3000ML 0.9% SOD CHL USP UROMATIC PLAS CONT

## (undated) DEVICE — SUTURE VCRL SZ 0 L36IN ABSRB UD L36MM CT-1 1/2 CIR J946H

## (undated) DEVICE — DRAPE,REIN 53X77,STERILE: Brand: MEDLINE

## (undated) DEVICE — COVER,TABLE,HEAVY DUTY,77"X90",STRL: Brand: MEDLINE

## (undated) DEVICE — SUTURE STRATAFIX SZ 3-0 30CM NONABSORB UD 26MM FS 3/8 SXMP2B412

## (undated) DEVICE — HANDPIECE SET WITH COAXIAL HIGH FLOW TIP AND SUCTION TUBE: Brand: INTERPULSE

## (undated) DEVICE — BANDAGE COBAN 4 IN COMPR W4INXL5YD FOAM COHESIVE QUIK STK SELF ADH SFT

## (undated) DEVICE — SUTURE FIBERWIRE SZ 2 W/ TAPERED NEEDLE BLUE L38IN NONABSORB BLU L26.5MM 1/2 CIRCLE AR7200

## (undated) DEVICE — Device: Brand: JELCO

## (undated) DEVICE — INTENDED FOR TISSUE SEPARATION, AND OTHER PROCEDURES THAT REQUIRE A SHARP SURGICAL BLADE TO PUNCTURE OR CUT.: Brand: BARD-PARKER ® CARBON RIB-BACK BLADES

## (undated) DEVICE — BASIN ST MAJOR-NO CAUTERY: Brand: MEDLINE INDUSTRIES, INC.

## (undated) DEVICE — REM POLYHESIVE ADULT PATIENT RETURN ELECTRODE: Brand: VALLEYLAB

## (undated) DEVICE — SPONGE LAP 18X18IN STRL -- 5/PK

## (undated) DEVICE — DERMABOND SKIN ADH 0.7ML -- DERMABOND ADVANCED 12/BX

## (undated) DEVICE — COVER LT HNDL PLAS RIG 1 PER PK

## (undated) DEVICE — STERILE POLYISOPRENE POWDER-FREE SURGICAL GLOVES: Brand: PROTEXIS

## (undated) DEVICE — STOCKINETTE,IMPERVIOUS,12X48,STERILE: Brand: MEDLINE

## (undated) DEVICE — SYR 10ML LUER LOK 1/5ML GRAD --

## (undated) DEVICE — SOLUTION IRRIG 1000ML STRL H2O USP PLAS POUR BTL

## (undated) DEVICE — CLIP LIG M BLU TI HRT SHP WIRE HORZ 600 PER BX

## (undated) DEVICE — TIP SUCT CRV REG REDI

## (undated) DEVICE — GLOVE ORANGE PI 8   MSG9080

## (undated) DEVICE — COVER,MAYO STAND,STERILE: Brand: MEDLINE

## (undated) DEVICE — DECANTER BAG 9": Brand: MEDLINE INDUSTRIES, INC.

## (undated) DEVICE — TOWEL SURG W17XL27IN STD BLU COT NONFENESTRATED PREWASHED

## (undated) DEVICE — PACK,SHOULDER II,DRAPE: Brand: MEDLINE

## (undated) DEVICE — TUBING SUCT 12FR MAL ALUM SHFT FN CAP VENT UNIV CONN W/ OBT

## (undated) DEVICE — OPTIFOAM GENTLE SA, POSTOP, 4X8: Brand: MEDLINE

## (undated) DEVICE — SUT VCRL 2-0 54IN UD --

## (undated) DEVICE — 3M™ IOBAN™ 2 ANTIMICROBIAL INCISE DRAPE 6640EZ: Brand: IOBAN™ 2

## (undated) DEVICE — OPTIVAC KIT DOUBLE MIX 80GM: Brand: DJO SURGICAL

## (undated) DEVICE — 3M™ IOBAN™ 2 ANTIMICROBIAL INCISE DRAPE 6651EZ: Brand: IOBAN™ 2

## (undated) DEVICE — CONTINU-FLO SOLUTION SET, 2 INJECTION SITES, MALE LUER LOCK ADAPTER WITH RETRACTABLE COLLAR, LARGE BORE STOPCOCK WITH ROTATING MALE LUER LOCK EXTENSION SET, 2 INJECTION SITES, MALE LUER LOCK ADAPTER WITH RETRACTABLE COLLAR: Brand: INTERLINK/CONTINU-FLO

## (undated) DEVICE — 3M™ TEGADERM™ TRANSPARENT FILM DRESSING FRAME STYLE, 1624W, 2-3/8 IN X 2-3/4 IN (6 CM X 7 CM), 100/CT 4CT/CASE: Brand: 3M™ TEGADERM™

## (undated) DEVICE — SUTURE VCRL SZ 2-0 L36IN ABSRB UD L36MM CT-1 1/2 CIR J945H

## (undated) DEVICE — HYPODERMIC SAFETY NEEDLE: Brand: MAGELLAN

## (undated) DEVICE — ROCKER SWITCH PENCIL BLADE ELECTRODE, HOLSTER: Brand: EDGE